# Patient Record
Sex: FEMALE | Race: WHITE
[De-identification: names, ages, dates, MRNs, and addresses within clinical notes are randomized per-mention and may not be internally consistent; named-entity substitution may affect disease eponyms.]

---

## 2020-09-19 ENCOUNTER — HOSPITAL ENCOUNTER (EMERGENCY)
Dept: HOSPITAL 8 - ED | Age: 30
Discharge: HOME | End: 2020-09-19
Payer: MEDICAID

## 2020-09-19 VITALS — DIASTOLIC BLOOD PRESSURE: 43 MMHG | SYSTOLIC BLOOD PRESSURE: 141 MMHG

## 2020-09-19 VITALS — HEIGHT: 66 IN | WEIGHT: 150.58 LBS | BODY MASS INDEX: 24.2 KG/M2

## 2020-09-19 DIAGNOSIS — I50.9: ICD-10-CM
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
## 2020-09-19 DIAGNOSIS — F32.9: ICD-10-CM

## 2020-09-19 DIAGNOSIS — F10.220: Primary | ICD-10-CM

## 2020-09-19 LAB
ALBUMIN SERPL-MCNC: 4.2 G/DL (ref 3.4–5)
ALP SERPL-CCNC: 57 U/L (ref 45–117)
ALT SERPL-CCNC: 27 U/L (ref 12–78)
ANION GAP SERPL CALC-SCNC: 8 MMOL/L (ref 5–15)
BASOPHILS # BLD AUTO: 0.03 X10^3/UL (ref 0–0.1)
BASOPHILS NFR BLD AUTO: 0 % (ref 0–1)
BILIRUB SERPL-MCNC: 0.5 MG/DL (ref 0.2–1)
CALCIUM SERPL-MCNC: 8.4 MG/DL (ref 8.5–10.1)
CHLORIDE SERPL-SCNC: 112 MMOL/L (ref 98–107)
CREAT SERPL-MCNC: 0.83 MG/DL (ref 0.55–1.02)
EOSINOPHIL # BLD AUTO: 0.1 X10^3/UL (ref 0–0.4)
EOSINOPHIL NFR BLD AUTO: 1 % (ref 1–7)
ERYTHROCYTE [DISTWIDTH] IN BLOOD BY AUTOMATED COUNT: 13.1 % (ref 9.6–15.2)
LYMPHOCYTES # BLD AUTO: 2.78 X10^3/UL (ref 1–3.4)
LYMPHOCYTES NFR BLD AUTO: 39 % (ref 22–44)
MCH RBC QN AUTO: 30.4 PG (ref 27–34.8)
MCHC RBC AUTO-ENTMCNC: 33.5 G/DL (ref 32.4–35.8)
MCV RBC AUTO: 90.9 FL (ref 80–100)
MD: NO
MONOCYTES # BLD AUTO: 0.5 X10^3/UL (ref 0.2–0.8)
MONOCYTES NFR BLD AUTO: 7 % (ref 2–9)
NEUTROPHILS # BLD AUTO: 3.82 X10^3/UL (ref 1.8–6.8)
NEUTROPHILS NFR BLD AUTO: 53 % (ref 42–75)
PLATELET # BLD AUTO: 298 X10^3/UL (ref 130–400)
PMV BLD AUTO: 9.1 FL (ref 7.4–10.4)
PROT SERPL-MCNC: 8.1 G/DL (ref 6.4–8.2)
RBC # BLD AUTO: 4.74 X10^6/UL (ref 3.82–5.3)

## 2020-09-19 PROCEDURE — 80053 COMPREHEN METABOLIC PANEL: CPT

## 2020-09-19 PROCEDURE — 96360 HYDRATION IV INFUSION INIT: CPT

## 2020-09-19 PROCEDURE — 99283 EMERGENCY DEPT VISIT LOW MDM: CPT

## 2020-09-19 PROCEDURE — 84703 CHORIONIC GONADOTROPIN ASSAY: CPT

## 2020-09-19 PROCEDURE — 36415 COLL VENOUS BLD VENIPUNCTURE: CPT

## 2020-09-19 PROCEDURE — 83690 ASSAY OF LIPASE: CPT

## 2020-09-19 PROCEDURE — 96361 HYDRATE IV INFUSION ADD-ON: CPT

## 2020-09-19 PROCEDURE — 85025 COMPLETE CBC W/AUTO DIFF WBC: CPT

## 2020-09-19 NOTE — NUR
FIRST CONTACT WITH PT. PT IS HERE FOR DETOX. HX CHF. LAST DRINK TODAY AROUND 
1PM. PT C/O N/V. PT'S AOX4. RESPS EVEN AND UNLABORED. BP/SPO2 MONITORS IN 
PLACE. CALL LIGHT WITHIN REACH. PA AT BEDSIDE EVALUATING AT THIS TIME.

## 2020-09-19 NOTE — NUR
piv est on l hand with no complications. pt medicated per emar. ns infusing at 
this time. pt tolerated well.

## 2021-01-06 ENCOUNTER — NURSE TRIAGE (OUTPATIENT)
Dept: HEALTH INFORMATION MANAGEMENT | Facility: OTHER | Age: 31
End: 2021-01-06

## 2021-01-06 NOTE — TELEPHONE ENCOUNTER
"PT and boyfriend calling in d/t symptom severity. Pt dx with Covid and symptomatic over a week. Feels like she's getting worse, not better. Has Hx of CHF and is having diff breathing upon exertion and is feeling chest heaviness. advised on home care options and to go to  to get evaluated d/t high risk diagnosis.    Reason for Disposition  • MILD difficulty breathing (e.g., minimal/no SOB at rest, SOB with walking, pulse <100)    Additional Information  • Negative: SEVERE difficulty breathing (e.g., struggling for each breath, speaks in single words)  • Negative: Difficult to awaken or acting confused (e.g., disoriented, slurred speech)  • Negative: Bluish (or gray) lips or face now  • Negative: Shock suspected (e.g., cold/pale/clammy skin, too weak to stand, low BP, rapid pulse)  • Negative: Sounds like a life-threatening emergency to the triager  • Negative: [1] COVID-19 suspected (e.g., cough, fever, shortness of breath) AND [2] public health department recommends testing  • Negative: [1] COVID-19 exposure AND [2] no symptoms  • Negative: COVID-19 and Breastfeeding, questions about  • Negative: SEVERE or constant chest pain (Exception: mild central chest pain, present only when coughing)  • Negative: MODERATE difficulty breathing (e.g., speaks in phrases, SOB even at rest, pulse 100-120)    Answer Assessment - Initial Assessment Questions  1. COVID-19 DIAGNOSIS: \"Who made your Coronavirus (COVID-19) diagnosis?\" \"Was it confirmed by a positive lab test?\" If not diagnosed by a HCP, ask \"Are there lots of cases (community spread) where you live?\" (See public health department website, if unsure)    * MAJOR community spread: high number of cases; numbers of cases are increasing; many people hospitalized.    * MINOR community spread: low number of cases; not increasing; few or no people hospitalized      Major, exposed Sunday  2. ONSET: \"When did the COVID-19 symptoms start?\"      1/29  3. WORST SYMPTOM: \"What is " "your worst symptom?\" (e.g., cough, fever, shortness of breath, muscle aches)    Chills    4. COUGH: \"How bad is the cough?\"        mild  5. FEVER: \"Do you have a fever?\" If so, ask: \"What is your temperature, how was it measured, and when did it start?\"      unsure  6. RESPIRATORY STATUS: \"Describe your breathing?\" (e.g., shortness of breath, wheezing, unable to speak)       Chest heaviness  7. BETTER-SAME-WORSE: \"Are you getting better, staying the same or getting worse compared to yesterday?\"  If getting worse, ask, \"In what way?\"      worse  8. HIGH RISK DISEASE: \"Do you have any chronic medical problems?\" (e.g., asthma, heart or lung disease, weak immune system, etc.)      CHF,   9. PREGNANCY: \"Is there any chance you are pregnant?\" \"When was your last menstrual period?\"      No  10. OTHER SYMPTOMS: \"Do you have any other symptoms?\"  (e.g., runny nose, headache, sore throat, loss of smell)        Fever, bodyaches, chills, fatigue, HA, nausea, vomitting diarrhea, SOB, cough, congestion, ST, loss taste/smell    Protocols used: CORONAVIRUS (COVID-19) DIAGNOSED OR [id number]      "

## 2021-01-07 ENCOUNTER — OFFICE VISIT (OUTPATIENT)
Dept: URGENT CARE | Facility: CLINIC | Age: 31
End: 2021-01-07
Payer: MEDICAID

## 2021-01-07 ENCOUNTER — APPOINTMENT (OUTPATIENT)
Dept: RADIOLOGY | Facility: IMAGING CENTER | Age: 31
End: 2021-01-07
Attending: PHYSICIAN ASSISTANT
Payer: MEDICAID

## 2021-01-07 VITALS
BODY MASS INDEX: 24.59 KG/M2 | RESPIRATION RATE: 14 BRPM | OXYGEN SATURATION: 99 % | WEIGHT: 153 LBS | TEMPERATURE: 97 F | HEART RATE: 84 BPM | DIASTOLIC BLOOD PRESSURE: 88 MMHG | HEIGHT: 66 IN | SYSTOLIC BLOOD PRESSURE: 108 MMHG

## 2021-01-07 DIAGNOSIS — R05.9 COUGH: ICD-10-CM

## 2021-01-07 DIAGNOSIS — R06.02 SOB (SHORTNESS OF BREATH): ICD-10-CM

## 2021-01-07 DIAGNOSIS — U07.1 COVID-19: ICD-10-CM

## 2021-01-07 DIAGNOSIS — U07.1 COVID-19: Primary | ICD-10-CM

## 2021-01-07 PROCEDURE — 99214 OFFICE O/P EST MOD 30 MIN: CPT | Mod: CS | Performed by: PHYSICIAN ASSISTANT

## 2021-01-07 PROCEDURE — 71046 X-RAY EXAM CHEST 2 VIEWS: CPT | Mod: TC,CS | Performed by: PHYSICIAN ASSISTANT

## 2021-01-07 RX ORDER — DEXAMETHASONE 4 MG/1
2 TABLET ORAL 2 TIMES DAILY
Qty: 1 EACH | Refills: 0 | Status: SHIPPED | OUTPATIENT
Start: 2021-01-07 | End: 2021-01-17

## 2021-01-07 ASSESSMENT — ENCOUNTER SYMPTOMS
DIARRHEA: 0
NAUSEA: 0
SPUTUM PRODUCTION: 1
FEVER: 1
SHORTNESS OF BREATH: 1
ABDOMINAL PAIN: 0
VOMITING: 0
COUGH: 1
MYALGIAS: 1
CONSTIPATION: 0
CHILLS: 1

## 2021-01-07 NOTE — PROGRESS NOTES
Subjective:   Juliette Snyder is a 30 y.o. female who presents for Cough (Cough, SOB, hot, cold Covid Positive 1/4/21, wants to check for pneumonia )        Cough  This is a new problem. Episode onset: 10 days  The problem has been unchanged. The cough is productive of sputum. Associated symptoms include chills, a fever, myalgias, nasal congestion and shortness of breath. Risk factors: Occassional tobacco use  Treatments tried: dayquil, nyquil, musinex, ibupofen, vicks  There is no history of asthma, bronchitis or pneumonia.     The patient has had Covid symptoms for the past 10 days. She tested positive for Covid on January 4. She is still experiencing cough, fever, myalgias. Her symptoms have slightly improved. She is concerned she may have Covid pneumonia.    Review of Systems   Constitutional: Positive for chills and fever. Negative for malaise/fatigue.   Respiratory: Positive for cough, sputum production and shortness of breath.    Gastrointestinal: Negative for abdominal pain, constipation, diarrhea, nausea and vomiting.   Musculoskeletal: Positive for myalgias.   All other systems reviewed and are negative.      PMH:  has a past medical history of Bronchitis, Hemorrhoids, and bladder infections.  MEDS:   Current Outpatient Medications:   •  fluticasone (FLOVENT HFA) 110 MCG/ACT Aerosol, Inhale 2 Puffs 2 times a day for 10 days., Disp: 1 Each, Rfl: 0  •  ibuprofen (MOTRIN) 800 MG Tab, Take 1 Tab by mouth every 8 hours as needed., Disp: 30 Tab, Rfl: 2  •  Omega-3 Fatty Acids (FISH OIL) 1000 MG Cap capsule, Take 1,000 mg by mouth 3 times a day, with meals., Disp: , Rfl:   •  5-Hydroxytryptophan (5-HTP PO), Take  by mouth., Disp: , Rfl:   •  norethindrone (MICRONOR) 0.35 MG tablet, Take 1 Tab by mouth every day., Disp: 28 Tab, Rfl: 11  •  Prenatal MV-Min-Fe Fum-FA-DHA (PRENATAL 1 PO), Take  by mouth., Disp: , Rfl:   ALLERGIES: No Known Allergies  SURGHX: History reviewed. No pertinent surgical history.  SOCHX:   "reports that she has been smoking cigarettes. She has a 4.50 pack-year smoking history. She has never used smokeless tobacco. She reports current alcohol use. She reports that she does not use drugs.  Family History   Problem Relation Age of Onset   • Other Mother         killed   • Diabetes Maternal Grandfather    • Hypertension Maternal Grandfather         Objective:   /88 (BP Location: Left arm, Patient Position: Sitting, BP Cuff Size: Adult)   Pulse 84   Temp 36.1 °C (97 °F) (Temporal)   Resp 14   Ht 1.676 m (5' 6\")   Wt 69.4 kg (153 lb)   SpO2 99%   BMI 24.69 kg/m²     Physical Exam  Vitals signs reviewed.   Constitutional:       General: She is not in acute distress.     Appearance: Normal appearance. She is well-developed. She is not ill-appearing.   HENT:      Head: Normocephalic and atraumatic.      Right Ear: External ear normal.      Left Ear: External ear normal.      Nose: Nose normal.      Mouth/Throat:      Mouth: Mucous membranes are moist.   Eyes:      Conjunctiva/sclera: Conjunctivae normal.      Pupils: Pupils are equal, round, and reactive to light.   Neck:      Musculoskeletal: Normal range of motion and neck supple. No neck rigidity.      Trachea: No tracheal deviation.   Cardiovascular:      Rate and Rhythm: Normal rate and regular rhythm.   Pulmonary:      Effort: Pulmonary effort is normal. No respiratory distress.      Breath sounds: Normal breath sounds. No stridor. No wheezing.   Skin:     General: Skin is warm and dry.      Capillary Refill: Capillary refill takes less than 2 seconds.   Neurological:      General: No focal deficit present.      Mental Status: She is alert and oriented to person, place, and time.   Psychiatric:         Mood and Affect: Mood normal.         Behavior: Behavior normal.           Assessment/Plan:     1. COVID-19  DX-CHEST-2 VIEWS    fluticasone (FLOVENT HFA) 110 MCG/ACT Aerosol   2. SOB (shortness of breath)  DX-CHEST-2 VIEWS   3. Cough  " DX-CHEST-2 VIEWS     Chest x-ray: Negative    Supportive care reviewed. Continue to monitor symptoms closely. She will continue to isolate until her symptoms have resolved for 24 hours.    If symptoms worsen or persist patient can return to clinic for reevaluation.  Red flags and emergency room precautions discussed. All side effects of medication discussed including allergic response, GI upset, tendon injury, etc. Patient confirmed understanding of information.    Please note that this dictation was created using voice recognition software. I have made every reasonable attempt to correct obvious errors, but I expect that there are errors of grammar and possibly content that I did not discover before finalizing the note.

## 2021-02-09 ENCOUNTER — HOSPITAL ENCOUNTER (EMERGENCY)
Dept: HOSPITAL 8 - ED | Age: 31
Discharge: HOME | End: 2021-02-09
Payer: MEDICAID

## 2021-02-09 VITALS — WEIGHT: 149.91 LBS | HEIGHT: 66 IN | BODY MASS INDEX: 24.09 KG/M2

## 2021-02-09 VITALS — SYSTOLIC BLOOD PRESSURE: 119 MMHG | DIASTOLIC BLOOD PRESSURE: 67 MMHG

## 2021-02-09 DIAGNOSIS — J02.8: Primary | ICD-10-CM

## 2021-02-09 DIAGNOSIS — I50.9: ICD-10-CM

## 2021-02-09 DIAGNOSIS — I44.7: ICD-10-CM

## 2021-02-09 PROCEDURE — 99284 EMERGENCY DEPT VISIT MOD MDM: CPT

## 2021-02-09 PROCEDURE — 87880 STREP A ASSAY W/OPTIC: CPT

## 2021-02-09 PROCEDURE — 93005 ELECTROCARDIOGRAM TRACING: CPT

## 2021-02-09 PROCEDURE — 87081 CULTURE SCREEN ONLY: CPT

## 2021-02-09 NOTE — NUR
PT MEDICATED PER MAR. PAIN 10/10. PT STATES SHE FEELS VERY ANXIOUS AND IS 
PACING THE ROOM. PT SETTLED BACK IN BED WITH CALL LIGHT. PT SIGNIFICANT OTHER 
BEDSIDE.

## 2021-02-09 NOTE — NUR
Patient presents to ER c/o swollen right side tonsil causing some diff 
swallowing. Patient had COVID 1/5. Afterward developed a kidney infection which 
she just finished antiobiotics for. Patient has now developed current symptoms. 


Patient is anxious. Respirations even and unlabored. Right side of neck 
swollen. Patient is speaking in full sentences.

## 2021-02-09 NOTE — NUR
PT REC'VD DISCHARGE INSTRUCTIONS AND EDUCATION. PT HAD NO FURTHER QUESTIONS. PT 
AMBULATED TO DC AREA WITH SIGNIFICANT OTHER, STEADY GAIT.

## 2021-04-08 ENCOUNTER — HOSPITAL ENCOUNTER (EMERGENCY)
Dept: HOSPITAL 8 - ED | Age: 31
LOS: 1 days | Discharge: HOME | End: 2021-04-09
Payer: MEDICAID

## 2021-04-08 VITALS — BODY MASS INDEX: 25.16 KG/M2 | WEIGHT: 156.53 LBS | HEIGHT: 66 IN

## 2021-04-08 DIAGNOSIS — E86.0: ICD-10-CM

## 2021-04-08 DIAGNOSIS — F10.129: ICD-10-CM

## 2021-04-08 DIAGNOSIS — R07.89: ICD-10-CM

## 2021-04-08 DIAGNOSIS — Z72.9: ICD-10-CM

## 2021-04-08 DIAGNOSIS — K29.20: Primary | ICD-10-CM

## 2021-04-08 DIAGNOSIS — R10.13: ICD-10-CM

## 2021-04-08 DIAGNOSIS — R11.2: ICD-10-CM

## 2021-04-08 DIAGNOSIS — I50.9: ICD-10-CM
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
## 2021-04-08 PROCEDURE — 85025 COMPLETE CBC W/AUTO DIFF WBC: CPT

## 2021-04-08 PROCEDURE — 80320 DRUG SCREEN QUANTALCOHOLS: CPT

## 2021-04-08 PROCEDURE — 83690 ASSAY OF LIPASE: CPT

## 2021-04-08 PROCEDURE — 36415 COLL VENOUS BLD VENIPUNCTURE: CPT

## 2021-04-08 PROCEDURE — 84484 ASSAY OF TROPONIN QUANT: CPT

## 2021-04-08 PROCEDURE — 96375 TX/PRO/DX INJ NEW DRUG ADDON: CPT

## 2021-04-08 PROCEDURE — 93005 ELECTROCARDIOGRAM TRACING: CPT

## 2021-04-08 PROCEDURE — 96361 HYDRATE IV INFUSION ADD-ON: CPT

## 2021-04-08 PROCEDURE — 80053 COMPREHEN METABOLIC PANEL: CPT

## 2021-04-08 PROCEDURE — 84703 CHORIONIC GONADOTROPIN ASSAY: CPT

## 2021-04-08 PROCEDURE — 99284 EMERGENCY DEPT VISIT MOD MDM: CPT

## 2021-04-08 PROCEDURE — 71045 X-RAY EXAM CHEST 1 VIEW: CPT

## 2021-04-08 PROCEDURE — G0480 DRUG TEST DEF 1-7 CLASSES: HCPCS

## 2021-04-08 PROCEDURE — 96374 THER/PROPH/DIAG INJ IV PUSH: CPT

## 2021-04-09 VITALS — SYSTOLIC BLOOD PRESSURE: 133 MMHG | DIASTOLIC BLOOD PRESSURE: 83 MMHG

## 2021-04-09 LAB
ALBUMIN SERPL-MCNC: 4.5 G/DL (ref 3.4–5)
ALP SERPL-CCNC: 61 U/L (ref 45–117)
ALT SERPL-CCNC: 39 U/L (ref 12–78)
ANION GAP SERPL CALC-SCNC: 13 MMOL/L (ref 5–15)
BASOPHILS # BLD AUTO: 0.1 X10^3/UL (ref 0–0.1)
BASOPHILS NFR BLD AUTO: 1 % (ref 0–1)
BILIRUB SERPL-MCNC: 0.5 MG/DL (ref 0.2–1)
CALCIUM SERPL-MCNC: 9.4 MG/DL (ref 8.5–10.1)
CHLORIDE SERPL-SCNC: 110 MMOL/L (ref 98–107)
CREAT SERPL-MCNC: 1.08 MG/DL (ref 0.55–1.02)
EOSINOPHIL # BLD AUTO: 0 X10^3/UL (ref 0–0.4)
EOSINOPHIL NFR BLD AUTO: 0 % (ref 1–7)
ERYTHROCYTE [DISTWIDTH] IN BLOOD BY AUTOMATED COUNT: 14 % (ref 9.6–15.2)
LYMPHOCYTES # BLD AUTO: 1.8 X10^3/UL (ref 1–3.4)
LYMPHOCYTES NFR BLD AUTO: 16 % (ref 22–44)
MCH RBC QN AUTO: 32.3 PG (ref 27–34.8)
MCHC RBC AUTO-ENTMCNC: 34 G/DL (ref 32.4–35.8)
MD: NO
MONOCYTES # BLD AUTO: 0.5 X10^3/UL (ref 0.2–0.8)
MONOCYTES NFR BLD AUTO: 4 % (ref 2–9)
NEUTROPHILS # BLD AUTO: 8.9 X10^3/UL (ref 1.8–6.8)
NEUTROPHILS NFR BLD AUTO: 79 % (ref 42–75)
PLATELET # BLD AUTO: 317 X10^3/UL (ref 130–400)
PMV BLD AUTO: 10.2 FL (ref 7.4–10.4)
PROT SERPL-MCNC: 8.4 G/DL (ref 6.4–8.2)
RBC # BLD AUTO: 4.64 X10^6/UL (ref 3.82–5.3)
TROPONIN I SERPL-MCNC: < 0.015 NG/ML (ref 0–0.04)

## 2021-04-09 NOTE — NUR
Patient given discharge instructions and they have confirmed that they 
understand the instructions.  Patient ambulatory with steady gait. nad, denies 
additional questions or needs, no personal belongings left in room after dc.

## 2021-04-09 NOTE — NUR
BREAK RN: PT CURRENTLY SITTING ON [employer]. NO ACUTE DISTRESS NOTED AT THIS TIME. 
PT APPEARS TO BE RESTING COMFORTABLY. SKIN PINK, WARM AND DRY. RESP EVEN AND 
UNLABORED. SIGNIFICANT OTHER AT BEDSIDE. PT ON CONT BP, CARDIAC AND SPO2 
MONITORS. ICE CHIPS PROVIDED BY PRIMARY RN. CALL LIGHT WITHIN REACH.

## 2021-04-09 NOTE — NUR
PT C/O COMING INTO ED TODAY D/T ANXIETY, N/V Q15 MINS AND CP. PT BROUGHT BACK 
TO ROOM, THROWING UP BILE AND FOAM, PT IV ESTABLISHED, LABS OBTAINED, WALKED TO 
AND FROM RESTROOM WITH A SMOOTH AND STEADY GAIT, UA OBTAINED AND SENT TO LAB. 
PT MEDICATED PER MAR, SO AT BS, BED IN LOWEST, RAILS ENGAGED, CALL LIGHT ON 
LAP, WCTM.

## 2021-04-09 NOTE — NUR
pt resting on gurney, appears slightly uncomfortable again, states the nausea 
is back, erp aware, pt medicated per mar, nad, wctm. to be dc'd

## 2021-05-03 ENCOUNTER — NURSE TRIAGE (OUTPATIENT)
Dept: HEALTH INFORMATION MANAGEMENT | Facility: OTHER | Age: 31
End: 2021-05-03

## 2021-05-03 ENCOUNTER — HOSPITAL ENCOUNTER (EMERGENCY)
Dept: HOSPITAL 8 - ED | Age: 31
Discharge: HOME | End: 2021-05-03
Payer: MEDICAID

## 2021-05-03 VITALS — BODY MASS INDEX: 23.77 KG/M2 | HEIGHT: 66 IN | WEIGHT: 147.93 LBS

## 2021-05-03 VITALS — SYSTOLIC BLOOD PRESSURE: 128 MMHG | DIASTOLIC BLOOD PRESSURE: 78 MMHG

## 2021-05-03 DIAGNOSIS — R10.11: ICD-10-CM

## 2021-05-03 DIAGNOSIS — R10.31: ICD-10-CM

## 2021-05-03 DIAGNOSIS — K52.9: Primary | ICD-10-CM

## 2021-05-03 LAB
ALBUMIN SERPL-MCNC: 4.3 G/DL (ref 3.4–5)
ALP SERPL-CCNC: 62 U/L (ref 45–117)
ALT SERPL-CCNC: 46 U/L (ref 12–78)
ANION GAP SERPL CALC-SCNC: 9 MMOL/L (ref 5–15)
BASOPHILS # BLD AUTO: 0.1 X10^3/UL (ref 0–0.1)
BASOPHILS NFR BLD AUTO: 1 % (ref 0–1)
BILIRUB SERPL-MCNC: 0.5 MG/DL (ref 0.2–1)
CALCIUM SERPL-MCNC: 8.6 MG/DL (ref 8.5–10.1)
CHLORIDE SERPL-SCNC: 111 MMOL/L (ref 98–107)
CREAT SERPL-MCNC: 0.78 MG/DL (ref 0.55–1.02)
EOSINOPHIL # BLD AUTO: 0.1 X10^3/UL (ref 0–0.4)
EOSINOPHIL NFR BLD AUTO: 1 % (ref 1–7)
ERYTHROCYTE [DISTWIDTH] IN BLOOD BY AUTOMATED COUNT: 13.5 % (ref 9.6–15.2)
LYMPHOCYTES # BLD AUTO: 1.9 X10^3/UL (ref 1–3.4)
LYMPHOCYTES NFR BLD AUTO: 23 % (ref 22–44)
MCH RBC QN AUTO: 32.3 PG (ref 27–34.8)
MCHC RBC AUTO-ENTMCNC: 33.9 G/DL (ref 32.4–35.8)
MICROSCOPIC: (no result)
MONOCYTES # BLD AUTO: 0.5 X10^3/UL (ref 0.2–0.8)
MONOCYTES NFR BLD AUTO: 6 % (ref 2–9)
NEUTROPHILS # BLD AUTO: 5.7 X10^3/UL (ref 1.8–6.8)
NEUTROPHILS NFR BLD AUTO: 69 % (ref 42–75)
PLATELET # BLD AUTO: 330 X10^3/UL (ref 130–400)
PMV BLD AUTO: 8.7 FL (ref 7.4–10.4)
PROT SERPL-MCNC: 8.3 G/DL (ref 6.4–8.2)
RBC # BLD AUTO: 4.34 X10^6/UL (ref 3.82–5.3)

## 2021-05-03 PROCEDURE — 36415 COLL VENOUS BLD VENIPUNCTURE: CPT

## 2021-05-03 PROCEDURE — 84703 CHORIONIC GONADOTROPIN ASSAY: CPT

## 2021-05-03 PROCEDURE — 80053 COMPREHEN METABOLIC PANEL: CPT

## 2021-05-03 PROCEDURE — 83690 ASSAY OF LIPASE: CPT

## 2021-05-03 PROCEDURE — 76700 US EXAM ABDOM COMPLETE: CPT

## 2021-05-03 PROCEDURE — 87086 URINE CULTURE/COLONY COUNT: CPT

## 2021-05-03 PROCEDURE — 99285 EMERGENCY DEPT VISIT HI MDM: CPT

## 2021-05-03 PROCEDURE — 96361 HYDRATE IV INFUSION ADD-ON: CPT

## 2021-05-03 PROCEDURE — 85025 COMPLETE CBC W/AUTO DIFF WBC: CPT

## 2021-05-03 PROCEDURE — 96375 TX/PRO/DX INJ NEW DRUG ADDON: CPT

## 2021-05-03 PROCEDURE — 96374 THER/PROPH/DIAG INJ IV PUSH: CPT

## 2021-05-03 PROCEDURE — 96376 TX/PRO/DX INJ SAME DRUG ADON: CPT

## 2021-05-03 PROCEDURE — 74021 RADEX ABDOMEN 3+ VIEWS: CPT

## 2021-05-03 PROCEDURE — 81001 URINALYSIS AUTO W/SCOPE: CPT

## 2021-05-03 RX ADMIN — MORPHINE SULFATE PRN MG: 4 INJECTION INTRAVENOUS at 13:31

## 2021-05-03 RX ADMIN — MORPHINE SULFATE PRN MG: 4 INJECTION INTRAVENOUS at 14:31

## 2021-05-03 NOTE — NUR
RLQ stabbing pain. Refused palpation in triage, states hurts worse with 
pressing on it. Drank ETOH for pain.  at bedside. pt attached to 
monitors. vss. steady gait back from bathroom. Darshan Art evaluated.

## 2021-05-03 NOTE — TELEPHONE ENCOUNTER
"Patient has 8 out of 10 stabbing abdominal pain for over 6 hours in RLQ. Advised to go to ED.     Reason for Disposition  • SEVERE abdominal pain (e.g., excruciating)    Additional Information  • Negative: Passed out (i.e., fainted, collapsed and was not responding)  • Negative: Shock suspected (e.g., cold/pale/clammy skin, too weak to stand, low BP, rapid pulse)  • Negative: Sounds like a life-threatening emergency to the triager  • Negative: Chest pain  • Negative: Pain is mainly in upper abdomen (if needed ask: 'is it mainly above the belly button?')  • Negative: Abdominal pain and pregnant > 20 weeks  • Negative: Abdominal pain and pregnant < 20 weeks    Answer Assessment - Initial Assessment Questions  1. LOCATION: \"Where does it hurt?\"       RLQ  2. RADIATION: \"Does the pain shoot anywhere else?\" (e.g., chest, back)      unknown  3. ONSET: \"When did the pain begin?\" (e.g., minutes, hours or days ago)       today  4. SUDDEN: \"Gradual or sudden onset?\"      suddenly  5. PATTERN \"Does the pain come and go, or is it constant?\"     - If constant: \"Is it getting better, staying the same, or worsening?\"       (Note: Constant means the pain never goes away completely; most serious pain is constant and it progresses)      - If intermittent: \"How long does it last?\" \"Do you have pain now?\"      (Note: Intermittent means the pain goes away completely between bouts)      constant  6. SEVERITY: \"How bad is the pain?\"  (e.g., Scale 1-10; mild, moderate, or severe)    - MILD (1-3): doesn't interfere with normal activities, abdomen soft and not tender to touch     - MODERATE (4-7): interferes with normal activities or awakens from sleep, tender to touch     - SEVERE (8-10): excruciating pain, doubled over, unable to do any normal activities       severe  7. RECURRENT SYMPTOM: \"Have you ever had this type of abdominal pain before?\" If so, ask: \"When was the last time?\" and \"What happened that time?\"       never  8. CAUSE: " "\"What do you think is causing the abdominal pain?\"      unknown  9. RELIEVING/AGGRAVATING FACTORS: \"What makes it better or worse?\" (e.g., movement, antacids, bowel movement)      unknown  10. OTHER SYMPTOMS: \"Has there been any vomiting, diarrhea, constipation, or urine problems?\"        none  11. PREGNANCY: \"Is there any chance you are pregnant?\" \"When was your last menstrual period?\"        Na    Protocols used: ABDOMINAL PAIN - FEMALE-A-OH      "

## 2021-07-19 ENCOUNTER — HOSPITAL ENCOUNTER (EMERGENCY)
Dept: HOSPITAL 8 - ED | Age: 31
Discharge: HOME | End: 2021-07-19
Payer: MEDICAID

## 2021-07-19 VITALS — SYSTOLIC BLOOD PRESSURE: 120 MMHG | DIASTOLIC BLOOD PRESSURE: 72 MMHG

## 2021-07-19 VITALS — BODY MASS INDEX: 22.04 KG/M2 | HEIGHT: 66 IN | WEIGHT: 137.13 LBS

## 2021-07-19 DIAGNOSIS — R11.2: ICD-10-CM

## 2021-07-19 DIAGNOSIS — E87.6: ICD-10-CM

## 2021-07-19 DIAGNOSIS — N10: Primary | ICD-10-CM

## 2021-07-19 DIAGNOSIS — I11.0: ICD-10-CM

## 2021-07-19 DIAGNOSIS — N93.9: ICD-10-CM

## 2021-07-19 DIAGNOSIS — I50.9: ICD-10-CM

## 2021-07-19 DIAGNOSIS — N30.00: ICD-10-CM

## 2021-07-19 LAB
ALBUMIN SERPL-MCNC: 4.1 G/DL (ref 3.4–5)
ALP SERPL-CCNC: 67 U/L (ref 45–117)
ALT SERPL-CCNC: 125 U/L (ref 12–78)
ANION GAP SERPL CALC-SCNC: 8 MMOL/L (ref 5–15)
BASOPHILS # BLD AUTO: 0 X10^3/UL (ref 0–0.1)
BASOPHILS NFR BLD AUTO: 0 % (ref 0–1)
BILIRUB SERPL-MCNC: 0.6 MG/DL (ref 0.2–1)
CALCIUM SERPL-MCNC: 8.6 MG/DL (ref 8.5–10.1)
CHLORIDE SERPL-SCNC: 106 MMOL/L (ref 98–107)
CREAT SERPL-MCNC: 0.8 MG/DL (ref 0.55–1.02)
EOSINOPHIL # BLD AUTO: 0 X10^3/UL (ref 0–0.4)
EOSINOPHIL NFR BLD AUTO: 0 % (ref 1–7)
ERYTHROCYTE [DISTWIDTH] IN BLOOD BY AUTOMATED COUNT: 14 % (ref 9.6–15.2)
LYMPHOCYTES # BLD AUTO: 2.3 X10^3/UL (ref 1–3.4)
LYMPHOCYTES NFR BLD AUTO: 32 % (ref 22–44)
MCH RBC QN AUTO: 32.2 PG (ref 27–34.8)
MCHC RBC AUTO-ENTMCNC: 34.3 G/DL (ref 32.4–35.8)
MICROSCOPIC: (no result)
MONOCYTES # BLD AUTO: 0.5 X10^3/UL (ref 0.2–0.8)
MONOCYTES NFR BLD AUTO: 7 % (ref 2–9)
NEUTROPHILS # BLD AUTO: 4.2 X10^3/UL (ref 1.8–6.8)
NEUTROPHILS NFR BLD AUTO: 60 % (ref 42–75)
PLATELET # BLD AUTO: 145 X10^3/UL (ref 130–400)
PMV BLD AUTO: 10.2 FL (ref 7.4–10.4)
PROT SERPL-MCNC: 8 G/DL (ref 6.4–8.2)
RBC # BLD AUTO: 4.5 X10^6/UL (ref 3.82–5.3)

## 2021-07-19 PROCEDURE — 85025 COMPLETE CBC W/AUTO DIFF WBC: CPT

## 2021-07-19 PROCEDURE — 99284 EMERGENCY DEPT VISIT MOD MDM: CPT

## 2021-07-19 PROCEDURE — 96361 HYDRATE IV INFUSION ADD-ON: CPT

## 2021-07-19 PROCEDURE — 80053 COMPREHEN METABOLIC PANEL: CPT

## 2021-07-19 PROCEDURE — 84703 CHORIONIC GONADOTROPIN ASSAY: CPT

## 2021-07-19 PROCEDURE — 96375 TX/PRO/DX INJ NEW DRUG ADDON: CPT

## 2021-07-19 PROCEDURE — 96365 THER/PROPH/DIAG IV INF INIT: CPT

## 2021-07-19 PROCEDURE — 87186 SC STD MICRODIL/AGAR DIL: CPT

## 2021-07-19 PROCEDURE — 36415 COLL VENOUS BLD VENIPUNCTURE: CPT

## 2021-07-19 PROCEDURE — 87077 CULTURE AEROBIC IDENTIFY: CPT

## 2021-07-19 PROCEDURE — 81001 URINALYSIS AUTO W/SCOPE: CPT

## 2021-07-19 PROCEDURE — 87086 URINE CULTURE/COLONY COUNT: CPT

## 2021-07-19 NOTE — NUR
PT BIB BOYFRIEND VIA POV. PT STATES "I HAVE A UTI AND MY DOCTOR TOLD ME SHE 
THINKS I AM TURNING SEPTIC." PT C/O BILATERAL FLANK PAIN, PAINFUL URINATION, 
BLOOD IN URINE, VOMITING, FEVERS. PT RESTING IN GURNEY, MONITORING IN PLACE, PT 
CRYING AND YELLING AT THIS TIME, BOYFRIEND AT BEDSIDE. PT BEING EXTREMELY 
UNCOOPERATIVE. WCTM.

## 2021-07-19 NOTE — NUR
PT MEDICATED PER EMAR, PT ONLY ALLOWING ED TECH TO PUT PIV IN FOOT, STATES "YOU 
DON'T KNOW ABOUT MY VEINS, YOU ONLY CAN GO IN MY FOOT". 22G PIV PLACED IN R 
FOOT BY EMT.

## 2022-01-27 ENCOUNTER — HOSPITAL ENCOUNTER (EMERGENCY)
Facility: MEDICAL CENTER | Age: 32
End: 2022-01-27
Attending: EMERGENCY MEDICINE
Payer: COMMERCIAL

## 2022-01-27 VITALS
SYSTOLIC BLOOD PRESSURE: 147 MMHG | WEIGHT: 149.47 LBS | HEIGHT: 66 IN | HEART RATE: 102 BPM | DIASTOLIC BLOOD PRESSURE: 80 MMHG | TEMPERATURE: 98.4 F | BODY MASS INDEX: 24.02 KG/M2 | RESPIRATION RATE: 18 BRPM | OXYGEN SATURATION: 96 %

## 2022-01-27 DIAGNOSIS — E86.0 DEHYDRATION: ICD-10-CM

## 2022-01-27 DIAGNOSIS — D72.829 LEUKOCYTOSIS, UNSPECIFIED TYPE: ICD-10-CM

## 2022-01-27 DIAGNOSIS — R11.2 NAUSEA AND VOMITING, INTRACTABILITY OF VOMITING NOT SPECIFIED, UNSPECIFIED VOMITING TYPE: ICD-10-CM

## 2022-01-27 DIAGNOSIS — F10.10 ALCOHOL ABUSE: ICD-10-CM

## 2022-01-27 DIAGNOSIS — F13.939 BENZODIAZEPINE WITHDRAWAL WITH COMPLICATION (HCC): ICD-10-CM

## 2022-01-27 LAB
ALBUMIN SERPL BCP-MCNC: 4.9 G/DL (ref 3.2–4.9)
ALBUMIN/GLOB SERPL: 1.8 G/DL
ALP SERPL-CCNC: 70 U/L (ref 30–99)
ALT SERPL-CCNC: 24 U/L (ref 2–50)
ANION GAP SERPL CALC-SCNC: 18 MMOL/L (ref 7–16)
APPEARANCE UR: ABNORMAL
AST SERPL-CCNC: 35 U/L (ref 12–45)
BACTERIA #/AREA URNS HPF: ABNORMAL /HPF
BASOPHILS # BLD AUTO: 0.4 % (ref 0–1.8)
BASOPHILS # BLD: 0.09 K/UL (ref 0–0.12)
BILIRUB SERPL-MCNC: 0.8 MG/DL (ref 0.1–1.5)
BILIRUB UR QL STRIP.AUTO: NEGATIVE
BUN SERPL-MCNC: 14 MG/DL (ref 8–22)
CALCIUM SERPL-MCNC: 8.7 MG/DL (ref 8.5–10.5)
CHLORIDE SERPL-SCNC: 107 MMOL/L (ref 96–112)
CO2 SERPL-SCNC: 18 MMOL/L (ref 20–33)
COLOR UR: ABNORMAL
CREAT SERPL-MCNC: 0.86 MG/DL (ref 0.5–1.4)
EOSINOPHIL # BLD AUTO: 0.01 K/UL (ref 0–0.51)
EOSINOPHIL NFR BLD: 0 % (ref 0–6.9)
EPI CELLS #/AREA URNS HPF: ABNORMAL /HPF
ERYTHROCYTE [DISTWIDTH] IN BLOOD BY AUTOMATED COUNT: 39.5 FL (ref 35.9–50)
GLOBULIN SER CALC-MCNC: 2.8 G/DL (ref 1.9–3.5)
GLUCOSE SERPL-MCNC: 171 MG/DL (ref 65–99)
GLUCOSE UR STRIP.AUTO-MCNC: NEGATIVE MG/DL
HCG SERPL QL: NEGATIVE
HCT VFR BLD AUTO: 40 % (ref 37–47)
HGB BLD-MCNC: 14.1 G/DL (ref 12–16)
IMM GRANULOCYTES # BLD AUTO: 0.11 K/UL (ref 0–0.11)
IMM GRANULOCYTES NFR BLD AUTO: 0.5 % (ref 0–0.9)
KETONES UR STRIP.AUTO-MCNC: ABNORMAL MG/DL
LEUKOCYTE ESTERASE UR QL STRIP.AUTO: ABNORMAL
LIPASE SERPL-CCNC: 22 U/L (ref 11–82)
LYMPHOCYTES # BLD AUTO: 2.27 K/UL (ref 1–4.8)
LYMPHOCYTES NFR BLD: 11.3 % (ref 22–41)
MCH RBC QN AUTO: 29.7 PG (ref 27–33)
MCHC RBC AUTO-ENTMCNC: 35.3 G/DL (ref 33.6–35)
MCV RBC AUTO: 84.2 FL (ref 81.4–97.8)
MICRO URNS: ABNORMAL
MONOCYTES # BLD AUTO: 0.94 K/UL (ref 0–0.85)
MONOCYTES NFR BLD AUTO: 4.7 % (ref 0–13.4)
NEUTROPHILS # BLD AUTO: 16.65 K/UL (ref 2–7.15)
NEUTROPHILS NFR BLD: 83.1 % (ref 44–72)
NITRITE UR QL STRIP.AUTO: NEGATIVE
NRBC # BLD AUTO: 0 K/UL
NRBC BLD-RTO: 0 /100 WBC
PH UR STRIP.AUTO: 5.5 [PH] (ref 5–8)
PLATELET # BLD AUTO: 282 K/UL (ref 164–446)
PMV BLD AUTO: 10.8 FL (ref 9–12.9)
POTASSIUM SERPL-SCNC: 3.3 MMOL/L (ref 3.6–5.5)
PROT SERPL-MCNC: 7.7 G/DL (ref 6–8.2)
PROT UR QL STRIP: 100 MG/DL
RBC # BLD AUTO: 4.75 M/UL (ref 4.2–5.4)
RBC # URNS HPF: ABNORMAL /HPF
RBC UR QL AUTO: ABNORMAL
SODIUM SERPL-SCNC: 143 MMOL/L (ref 135–145)
SP GR UR STRIP.AUTO: 1.04
UROBILINOGEN UR STRIP.AUTO-MCNC: 0.2 MG/DL
WBC # BLD AUTO: 20.1 K/UL (ref 4.8–10.8)
WBC #/AREA URNS HPF: ABNORMAL /HPF

## 2022-01-27 PROCEDURE — 83690 ASSAY OF LIPASE: CPT

## 2022-01-27 PROCEDURE — 85025 COMPLETE CBC W/AUTO DIFF WBC: CPT

## 2022-01-27 PROCEDURE — 99285 EMERGENCY DEPT VISIT HI MDM: CPT

## 2022-01-27 PROCEDURE — 700105 HCHG RX REV CODE 258: Performed by: EMERGENCY MEDICINE

## 2022-01-27 PROCEDURE — 87086 URINE CULTURE/COLONY COUNT: CPT

## 2022-01-27 PROCEDURE — 96361 HYDRATE IV INFUSION ADD-ON: CPT

## 2022-01-27 PROCEDURE — 700102 HCHG RX REV CODE 250 W/ 637 OVERRIDE(OP): Performed by: EMERGENCY MEDICINE

## 2022-01-27 PROCEDURE — 84703 CHORIONIC GONADOTROPIN ASSAY: CPT

## 2022-01-27 PROCEDURE — 96375 TX/PRO/DX INJ NEW DRUG ADDON: CPT

## 2022-01-27 PROCEDURE — 700111 HCHG RX REV CODE 636 W/ 250 OVERRIDE (IP): Performed by: EMERGENCY MEDICINE

## 2022-01-27 PROCEDURE — 96376 TX/PRO/DX INJ SAME DRUG ADON: CPT

## 2022-01-27 PROCEDURE — 80053 COMPREHEN METABOLIC PANEL: CPT

## 2022-01-27 PROCEDURE — 700111 HCHG RX REV CODE 636 W/ 250 OVERRIDE (IP)

## 2022-01-27 PROCEDURE — 81001 URINALYSIS AUTO W/SCOPE: CPT

## 2022-01-27 PROCEDURE — 96374 THER/PROPH/DIAG INJ IV PUSH: CPT

## 2022-01-27 PROCEDURE — A9270 NON-COVERED ITEM OR SERVICE: HCPCS | Performed by: EMERGENCY MEDICINE

## 2022-01-27 RX ORDER — LORAZEPAM 2 MG/ML
2 INJECTION INTRAMUSCULAR ONCE
Status: COMPLETED | OUTPATIENT
Start: 2022-01-27 | End: 2022-01-27

## 2022-01-27 RX ORDER — ONDANSETRON 2 MG/ML
4 INJECTION INTRAMUSCULAR; INTRAVENOUS ONCE
Status: COMPLETED | OUTPATIENT
Start: 2022-01-27 | End: 2022-01-27

## 2022-01-27 RX ORDER — PROMETHAZINE HYDROCHLORIDE 25 MG/1
25 SUPPOSITORY RECTAL EVERY 6 HOURS PRN
Qty: 10 SUPPOSITORY | Refills: 0 | Status: SHIPPED | OUTPATIENT
Start: 2022-01-27 | End: 2022-12-29

## 2022-01-27 RX ORDER — LORAZEPAM 2 MG/ML
1 INJECTION INTRAMUSCULAR ONCE
Status: COMPLETED | OUTPATIENT
Start: 2022-01-27 | End: 2022-01-27

## 2022-01-27 RX ORDER — ONDANSETRON 4 MG/1
4 TABLET, ORALLY DISINTEGRATING ORAL ONCE
Status: COMPLETED | OUTPATIENT
Start: 2022-01-27 | End: 2022-01-27

## 2022-01-27 RX ORDER — ONDANSETRON 4 MG/1
4 TABLET, ORALLY DISINTEGRATING ORAL EVERY 8 HOURS PRN
Qty: 10 TABLET | Refills: 0 | Status: SHIPPED | OUTPATIENT
Start: 2022-01-27 | End: 2022-12-29

## 2022-01-27 RX ORDER — SODIUM CHLORIDE 9 MG/ML
1000 INJECTION, SOLUTION INTRAVENOUS ONCE
Status: COMPLETED | OUTPATIENT
Start: 2022-01-27 | End: 2022-01-27

## 2022-01-27 RX ADMIN — SODIUM CHLORIDE 1000 ML: 9 INJECTION, SOLUTION INTRAVENOUS at 09:41

## 2022-01-27 RX ADMIN — ONDANSETRON 4 MG: 2 INJECTION INTRAMUSCULAR; INTRAVENOUS at 09:39

## 2022-01-27 RX ADMIN — ONDANSETRON 4 MG: 4 TABLET, ORALLY DISINTEGRATING ORAL at 07:58

## 2022-01-27 RX ADMIN — LORAZEPAM 2 MG: 2 INJECTION INTRAMUSCULAR; INTRAVENOUS at 09:57

## 2022-01-27 RX ADMIN — LIDOCAINE HYDROCHLORIDE 30 ML: 20 SOLUTION OROPHARYNGEAL at 09:42

## 2022-01-27 RX ADMIN — LORAZEPAM 1 MG: 2 INJECTION INTRAMUSCULAR; INTRAVENOUS at 09:40

## 2022-01-27 NOTE — ED TRIAGE NOTES
"Chief Complaint   Patient presents with   • Nausea/Vomiting/Diarrhea     since yesterday morning   • Alcohol Intoxication     last drink yesterday 1600       32 yo female to triage for above complaint. Patient reports she had been sober for 5 months, first drink was yesterday. Patient actively vomiting in triage.  Per EMS - patient given 4mg zofran ODT en route.    Pt is alert and oriented, speaking in full sentences, follows commands and responds appropriately to questions.     Patient placed back in lobby and educated on triage process. Asked to inform RN of any changes.    /97   Pulse 100   Temp 35.8 °C (96.5 °F) (Temporal)   Resp 16   Ht 1.676 m (5' 6\")   Wt 67.8 kg (149 lb 7.6 oz)   SpO2 97%   BMI 24.13 kg/m²     "

## 2022-01-27 NOTE — ED NOTES
"Pt awake, sitting upright in bed, speaking without signs of distress. States she feels as if her \"heart is racing\"  "

## 2022-01-27 NOTE — ED NOTES
Pt ambulatory to room with stable, steady gait. Continues to have nausea and dry heaves. No emesis noted at this time. Pt was noted to be drinking water from sink and from water bottle at time this RN made contact to bring her back to ED room. States last ETOH yesterday pm approx 1600. States previously had no ETOH for approx 5 months.

## 2022-01-27 NOTE — ED NOTES
Pt awake, standing and ambulating in room with stable gait. Speaking without signs of distress. States understanding of discharge instructions.

## 2022-01-27 NOTE — ED PROVIDER NOTES
ED Provider Note    Scribed for Pete Guerrier M.D. by Shraddha Peng. 2022  9:23 AM    Primary care provider: Pcp Pt States None  Means of arrival: Walk-in  History obtained from: Patient  History limited by: None    CHIEF COMPLAINT  Chief Complaint   Patient presents with    Nausea/Vomiting/Diarrhea     since yesterday morning    Alcohol Intoxication     last drink yesterday 1600     HPI  Juliette Snyder is a 31 y.o. female who presents to the Emergency Department for evaluation of constant nausea and active vomiting onset yesterday morning. Patient states that she has not drank for five months, 2 days ago, last 4 shots yesterday. EMS treated her with 4 mg Zofran en route. She admits to associated symptoms of diarrhea and cough, but denies fever. Patient denies chance of pregnancy. She adds she takes 0.5 mg klonopin BID for anxiety, and states she did not take her dose this morning.     REVIEW OF SYSTEMS  Pertinent positives include nausea, vomiting, diarrhea, and cough.   Pertinent negatives include no fever.    All other systems reviewed and negative.    PAST MEDICAL HISTORY   has a past medical history of Bronchitis, Hemorrhoids, and bladder infections.    SURGICAL HISTORY  patient denies any surgical history    SOCIAL HISTORY  Social History     Tobacco Use    Smoking status: Current Every Day Smoker     Packs/day: 0.50     Years: 9.00     Pack years: 4.50     Types: Cigarettes     Last attempt to quit: 10/19/2015     Years since quittin.2    Smokeless tobacco: Never Used   Substance Use Topics    Alcohol use: Yes    Drug use: No      Social History     Substance and Sexual Activity   Drug Use No       FAMILY HISTORY  Family History   Problem Relation Age of Onset    Other Mother         killed    Diabetes Maternal Grandfather     Hypertension Maternal Grandfather        CURRENT MEDICATIONS  Home Medications       Reviewed by Tamara Pace R.N. (Registered Nurse) on 22 at 0751  Med  "List Status: <None>     Medication Last Dose Status   5-Hydroxytryptophan (5-HTP PO)  Active   ibuprofen (MOTRIN) 800 MG Tab  Active   norethindrone (MICRONOR) 0.35 MG tablet  Active   Omega-3 Fatty Acids (FISH OIL) 1000 MG Cap capsule  Active   Prenatal MV-Min-Fe Fum-FA-DHA (PRENATAL 1 PO)  Active     0.5 mg klonopin BID               ALLERGIES  No Known Allergies    PHYSICAL EXAM  VITAL SIGNS: /97   Pulse 100   Temp 35.8 °C (96.5 °F) (Temporal)   Resp 16   Ht 1.676 m (5' 6\")   Wt 67.8 kg (149 lb 7.6 oz)   SpO2 97%   BMI 24.13 kg/m²     Nursing note and vitals reviewed.  Constitutional: Well-developed and well-nourished. Appears uncomfortable, Somewhat agitated.   HENT: Dry mucus membranes. Head is normocephalic and atraumatic. Oropharynx is clear and without exudate or erythema.   Eyes: Pupils are equal, round, and reactive to light. Conjunctiva are normal.   Cardiovascular: Tachycardic. No murmur heard. Normal radial pulses.  Pulmonary/Chest: Breath sounds normal. No wheezes or rales.   Abdominal: Soft and non-tender. No distention    Musculoskeletal: Extremities exhibit normal range of motion without edema or tenderness.   Neurological: Awake, alert and oriented to person, place, and time. No focal deficits noted.  Skin: Skin is warm and dry. No rash.   Psychiatric: Normal mood and affect. Appropriate for clinical situation.    DIAGNOSTIC STUDIES / PROCEDURES    LABS  Results for orders placed or performed during the hospital encounter of 01/27/22   CBC WITH DIFFERENTIAL   Result Value Ref Range    WBC 20.1 (H) 4.8 - 10.8 K/uL    RBC 4.75 4.20 - 5.40 M/uL    Hemoglobin 14.1 12.0 - 16.0 g/dL    Hematocrit 40.0 37.0 - 47.0 %    MCV 84.2 81.4 - 97.8 fL    MCH 29.7 27.0 - 33.0 pg    MCHC 35.3 (H) 33.6 - 35.0 g/dL    RDW 39.5 35.9 - 50.0 fL    Platelet Count 282 164 - 446 K/uL    MPV 10.8 9.0 - 12.9 fL    Neutrophils-Polys 83.10 (H) 44.00 - 72.00 %    Lymphocytes 11.30 (L) 22.00 - 41.00 %    Monocytes " 4.70 0.00 - 13.40 %    Eosinophils 0.00 0.00 - 6.90 %    Basophils 0.40 0.00 - 1.80 %    Immature Granulocytes 0.50 0.00 - 0.90 %    Nucleated RBC 0.00 /100 WBC    Neutrophils (Absolute) 16.65 (H) 2.00 - 7.15 K/uL    Lymphs (Absolute) 2.27 1.00 - 4.80 K/uL    Monos (Absolute) 0.94 (H) 0.00 - 0.85 K/uL    Eos (Absolute) 0.01 0.00 - 0.51 K/uL    Baso (Absolute) 0.09 0.00 - 0.12 K/uL    Immature Granulocytes (abs) 0.11 0.00 - 0.11 K/uL    NRBC (Absolute) 0.00 K/uL   COMP METABOLIC PANEL   Result Value Ref Range    Sodium 143 135 - 145 mmol/L    Potassium 3.3 (L) 3.6 - 5.5 mmol/L    Chloride 107 96 - 112 mmol/L    Co2 18 (L) 20 - 33 mmol/L    Anion Gap 18.0 (H) 7.0 - 16.0    Glucose 171 (H) 65 - 99 mg/dL    Bun 14 8 - 22 mg/dL    Creatinine 0.86 0.50 - 1.40 mg/dL    Calcium 8.7 8.5 - 10.5 mg/dL    AST(SGOT) 35 12 - 45 U/L    ALT(SGPT) 24 2 - 50 U/L    Alkaline Phosphatase 70 30 - 99 U/L    Total Bilirubin 0.8 0.1 - 1.5 mg/dL    Albumin 4.9 3.2 - 4.9 g/dL    Total Protein 7.7 6.0 - 8.2 g/dL    Globulin 2.8 1.9 - 3.5 g/dL    A-G Ratio 1.8 g/dL   LIPASE   Result Value Ref Range    Lipase 22 11 - 82 U/L   HCG QUAL SERUM   Result Value Ref Range    Beta-Hcg Qualitative Serum Negative Negative   URINALYSIS,CULTURE IF INDICATED    Specimen: Urine   Result Value Ref Range    Color DK Yellow     Character Cloudy (A)     Specific Gravity 1.037 <1.035    Ph 5.5 5.0 - 8.0    Glucose Negative Negative mg/dL    Ketones Trace (A) Negative mg/dL    Protein 100 (A) Negative mg/dL    Bilirubin Negative Negative    Urobilinogen, Urine 0.2 Negative    Nitrite Negative Negative    Leukocyte Esterase Trace (A) Negative    Occult Blood Moderate (A) Negative    Micro Urine Req Microscopic    ESTIMATED GFR   Result Value Ref Range    GFR If African American >60 >60 mL/min/1.73 m 2    GFR If Non African American >60 >60 mL/min/1.73 m 2   URINE MICROSCOPIC (W/UA)   Result Value Ref Range    WBC 5-10 (A) /hpf    RBC  (A) /hpf    Bacteria  Moderate (A) None /hpf    Epithelial Cells Many (A) /hpf     All labs reviewed by me.    COURSE & MEDICAL DECISION MAKING  Nursing notes, VS, PMSFHx reviewed in chart.     9:23 AM - Patient seen and examined at bedside. Patient will be treated with Ativan 1 mg injection, zofran 4 mg injection, and GI Cocktail 30 mL. Intravenous fluids were administered for dehydration.  Ordered Urrine microscopic w/UA, CBC w/diff, CMP, Lipase, HCG Qual serum, and UA if culture if indicated to evaluate her symptoms. I discussed plan for discharge following interventions and follow up as outlined below. The patient verbalizes they feel comfortable going home. The patient is stable for discharge after interventions and will return for any new or worsening symptoms. Patient verbalizes understanding and support with my plan for discharge.     9:54 AM - Nursing staff informed me the patient is still agitated. She will be treated with Ativan 2 mg injection.     10:24 AM - Urine sample appears to be contaminated, but patient is not having any urinary symptoms, so likely non-contributory.     HYDRATION: Based on the patient's presentation of Dehydration the patient was given IV fluids. IV Hydration was used because oral hydration was not adequate alone. Upon recheck following hydration, the patient was slightly improved.     The patient will return for new or worsening symptoms and is stable at the time of discharge.    DISPOSITION:  Patient will be discharged home in stable condition.    FOLLOW UP:  [facility], Emergency Dept  [address] [address] [phone]    If symptoms worsen      OUTPATIENT MEDICATIONS:  Discharge Medication List as of 1/27/2022 11:04 AM        START taking these medications    Details   ondansetron (ZOFRAN ODT) 4 MG TABLET DISPERSIBLE Take 1 Tablet by mouth every 8 hours as needed., Disp-10 Tablet, R-0, Normal      promethazine (PROMETHEGAN) 25 MG Suppos Insert 1 Suppository  into the rectum every 6 hours as needed for Nausea/Vomiting., Disp-10 Suppository, R-0, Normal             FINAL IMPRESSION  1. Nausea and vomiting, intractability of vomiting not specified, unspecified vomiting type    2. Alcohol abuse    3. Dehydration    4. Leukocytosis, unspecified type    5. Benzodiazepine withdrawal with complication (HCC)        Shraddha BONILLA (Scribe), am scribing for, and in the presence of, Pete Guerrier M.D..    Electronically signed by: Shraddha Peng (Scribe), 1/27/2022    IPete M.D. personally performed the services described in this documentation, as scribed by Shraddha Peng in my presence, and it is both accurate and complete. C.     The note accurately reflects work and decisions made by me.  Pete Guerrier M.D.  1/27/2022  11:37 AM

## 2022-01-27 NOTE — ED NOTES
Pt noted to be ambulatory and appears to be pacing in room. Speaking without signs of respiratory distress but states she does not feel well.

## 2022-01-27 NOTE — ED NOTES
Pt awake, sitting upright in bed, speaking without signs of distress. Noted by this RN to be using fingers in back of her throat to induce vomiting. Pt educated to not do this.

## 2022-01-29 LAB
BACTERIA UR CULT: NORMAL
SIGNIFICANT IND 70042: NORMAL
SITE SITE: NORMAL
SOURCE SOURCE: NORMAL

## 2022-03-21 ENCOUNTER — HOSPITAL ENCOUNTER (EMERGENCY)
Facility: MEDICAL CENTER | Age: 32
End: 2022-03-22
Attending: EMERGENCY MEDICINE
Payer: COMMERCIAL

## 2022-03-21 DIAGNOSIS — F32.A DEPRESSION, UNSPECIFIED DEPRESSION TYPE: ICD-10-CM

## 2022-03-21 DIAGNOSIS — F10.920 ALCOHOLIC INTOXICATION WITHOUT COMPLICATION (HCC): ICD-10-CM

## 2022-03-21 LAB
AMPHET UR QL SCN: NEGATIVE
BARBITURATES UR QL SCN: NEGATIVE
BENZODIAZ UR QL SCN: POSITIVE
BZE UR QL SCN: NEGATIVE
CANNABINOIDS UR QL SCN: NEGATIVE
METHADONE UR QL SCN: NEGATIVE
OPIATES UR QL SCN: NEGATIVE
OXYCODONE UR QL SCN: NEGATIVE
PCP UR QL SCN: NEGATIVE
POC BREATHALIZER: 0.12 PERCENT (ref 0–0.01)
POC BREATHALIZER: 0.26 PERCENT (ref 0–0.01)
PROPOXYPH UR QL SCN: NEGATIVE

## 2022-03-21 PROCEDURE — 99285 EMERGENCY DEPT VISIT HI MDM: CPT

## 2022-03-21 PROCEDURE — 700102 HCHG RX REV CODE 250 W/ 637 OVERRIDE(OP): Performed by: EMERGENCY MEDICINE

## 2022-03-21 PROCEDURE — 302970 POC BREATHALIZER: Performed by: EMERGENCY MEDICINE

## 2022-03-21 PROCEDURE — A9270 NON-COVERED ITEM OR SERVICE: HCPCS | Performed by: EMERGENCY MEDICINE

## 2022-03-21 PROCEDURE — 80307 DRUG TEST PRSMV CHEM ANLYZR: CPT

## 2022-03-21 RX ORDER — LORAZEPAM 1 MG/1
1 TABLET ORAL ONCE
Status: COMPLETED | OUTPATIENT
Start: 2022-03-21 | End: 2022-03-21

## 2022-03-21 RX ORDER — CLONAZEPAM 0.5 MG/1
0.5 TABLET ORAL ONCE
Status: COMPLETED | OUTPATIENT
Start: 2022-03-21 | End: 2022-03-21

## 2022-03-21 RX ORDER — HYDROXYZINE HYDROCHLORIDE 25 MG/1
25 TABLET, FILM COATED ORAL ONCE
Status: COMPLETED | OUTPATIENT
Start: 2022-03-21 | End: 2022-03-21

## 2022-03-21 RX ADMIN — HYDROXYZINE HYDROCHLORIDE 25 MG: 25 TABLET, FILM COATED ORAL at 19:59

## 2022-03-21 RX ADMIN — CLONAZEPAM 0.5 MG: 0.5 TABLET ORAL at 15:46

## 2022-03-21 RX ADMIN — CLONAZEPAM 0.5 MG: 0.5 TABLET ORAL at 18:27

## 2022-03-21 RX ADMIN — LORAZEPAM 1 MG: 1 TABLET ORAL at 22:05

## 2022-03-21 ASSESSMENT — FIBROSIS 4 INDEX: FIB4 SCORE: 0.84

## 2022-03-21 NOTE — ED TRIAGE NOTES
".  Chief Complaint   Patient presents with   • Legal 2000     Placed on legal hold by [employer].      Pt BIB [facility] and [employer] on a legal hold. Pt allegedly called her ex- and said she was suicidal and has a superficial laceration to left neck from a razor bleeding controlled (per [employer]). Pt denies SI/HI and states the laceration is from microblading her face/neck and accidental.  Pt appears intoxicated and admits to ETOH.  Pt is upset and asking for belongings. EMS administered 2mg versed IM prior to arrival.    Pt belongings have been removed and she is in a hospital gown. Warm blankets provided.    ./108   Pulse (!) 116   Temp 37.3 °C (99.2 °F) (Temporal)   Resp 20   Ht 1.676 m (5' 6\")   Wt 68 kg (150 lb)   LMP  (LMP Unknown)   SpO2 96%   BMI 24.21 kg/m²     "

## 2022-03-21 NOTE — ED PROVIDER NOTES
ER Provider Note     Scribed for Celio Edmonds M.D. by Cinthia Zaman. 3/21/2022, 2:28 PM.    Primary Care Provider: Pcp Pt States None  Means of Arrival: Ambulance   History obtained from: Patient  History limited by: None     CHIEF COMPLAINT  Chief Complaint   Patient presents with    Legal 2000     Placed on legal hold by [employer].     Alcohol Intoxication       HPI  Juliette Snyder is a 31 y.o. female who presents to the Emergency Department for evaluation of suicidal ideation. The patient called her boyfriend and told him that she was suicidal. She was then found to have a superficial abrasion to the left neck. The patient states that she accidentally cut herself while microbladng her face. She denies fever. No alleviating factors were reported. The patient is currently slurring her speech and appears intoxicated. She admits to drinking alcohol. EMS administered 2 mg versed en route to ED.        REVIEW OF SYSTEMS  See HPI for further details. All other systems are negative.   Pertinent positives include suicidal ideation. Pertinent negatives include no fever.         PAST MEDICAL HISTORY   has a past medical history of Bronchitis, Hemorrhoids, and bladder infections.    SURGICAL HISTORY  patient denies any surgical history    SOCIAL HISTORY  Social History     Tobacco Use    Smoking status: Current Every Day Smoker     Packs/day: 0.50     Years: 9.00     Pack years: 4.50     Types: Cigarettes     Last attempt to quit: 10/19/2015     Years since quittin.4    Smokeless tobacco: Never Used   Substance Use Topics    Alcohol use: Yes     Comment: occ    Drug use: No      Social History     Substance and Sexual Activity   Drug Use No       FAMILY HISTORY  Family History   Problem Relation Age of Onset    Other Mother         killed    Diabetes Maternal Grandfather     Hypertension Maternal Grandfather        CURRENT MEDICATIONS  Home Medications       Reviewed by Gela Crockett R.N. (Registered Nurse) on 22  "at 1421  Med List Status: <None>     Medication Last Dose Status   5-Hydroxytryptophan (5-HTP PO)  Active   ibuprofen (MOTRIN) 800 MG Tab  Active   norethindrone (MICRONOR) 0.35 MG tablet  Active   Omega-3 Fatty Acids (FISH OIL) 1000 MG Cap capsule  Active   ondansetron (ZOFRAN ODT) 4 MG TABLET DISPERSIBLE  Active   Prenatal MV-Min-Fe Fum-FA-DHA (PRENATAL 1 PO)  Active   promethazine (PROMETHEGAN) 25 MG Suppos  Active                    ALLERGIES  No Known Allergies    PHYSICAL EXAM  VITAL SIGNS: /108   Pulse (!) 116   Temp 37.3 °C (99.2 °F) (Temporal)   Resp 20   Ht 1.676 m (5' 6\")   Wt 68 kg (150 lb)   LMP  (LMP Unknown)   SpO2 96%   BMI 24.21 kg/m²      Constitutional: Slurring words  HENT: No signs of trauma, Bilateral external ears normal, Nose normal.   Eyes: Pupils are equal and reactive, Conjunctiva normal, Non-icteric.   Neck: Normal range of motion, No tenderness, Supple, No stridor.   Lymphatic: No lymphadenopathy noted.   Cardiovascular: Regular rate and rhythm, no palpable thrill  Thorax & Lungs: No respiratory distress,  No chest tenderness.  CTAB  Abdomen: Bowel sounds normal, Soft, No tenderness, No masses, No pulsatile masses. No peritoneal signs.  Skin: 3cm linear abrasion to left neck. Warm, Dry, No erythema, No rash.   Back: No bony tenderness, No CVA tenderness.   Extremities: Intact distal pulses, No edema, No tenderness, No cyanosis.  Musculoskeletal: Good range of motion in all major joints. No tenderness to palpation or major deformities noted.   Neurologic: Slurring words  Psychiatric: Slurring words      DIAGNOSTIC STUDIES / PROCEDURES    LABS  Labs Reviewed   URINE DRUG SCREEN - Abnormal; Notable for the following components:       Result Value    Benzodiazepines Positive (*)     All other components within normal limits   POC BREATHALIZER - Abnormal; Notable for the following components:    POC Breathalizer 0.256 (*)     All other components within normal limits   POC " BREATHALIZER   POC BREATHALIZER   POC BREATHALIZER     All labs reviewed by me.      COURSE & MEDICAL DECISION MAKING  Pertinent Labs & Imaging studies reviewed. (See chart for details)    This is a 31 y.o. female that presents intoxicated.  She had mentions of suicidal ideation and abrasion on her neck.  At this time she denies suicidal ideation but is very intoxicated and had a conversation with her boyfriend stating her suicidality.  At this time we will allow the patient to sober up and reassess her suicidality..     2:28 PM - Patient seen and examined at bedside. Ordered POC breathalizer and Urine drug screen.      3:42 PM - Informed patient that she will need to stay in the hospital.     3:44PM - Ordered Klonopin 0.5 mg.     Admitted to ED observation at  3/21/2022, 6:39 PM for reassessment of suicidal ideation and alcohol intoxication.      Patient has been reassessed multiple times in the still intoxicated.  She is required multiple medications to help with her anxiety.  We will have the oncoming emergency physician continue to reassess her until she is sober and then should be seen by the behavioral health team.    FINAL IMPRESSION  1. Alcoholic intoxication without complication (HCC)    2. Depression, unspecified depression type          I, Cinthia Zaman (Paola), am scribing for, and in the presence of, Celio Edmonds M.D..    Electronically signed by: Cinthia Zaman (Gageibdanielle), 3/21/2022    ICelio M.D. personally performed the services described in this documentation, as scribed by Cinthia Zaman in my presence, and it is both accurate and complete.     The note accurately reflects work and decisions made by me.  Celio Edmonds M.D.  3/21/2022  9:17 PM

## 2022-03-21 NOTE — CONSULTS
Attempted to see the patient for Alert Team Consult she was uncooperative and aggressive. Informed RN that the patient would not be assessed until NOC shift arrived if I could not see the patient now, SEEMA Ren agreed patients current BAL is .22 at 1600, will have noc shift assess.

## 2022-03-21 NOTE — ED NOTES
Pt out of room yelling at staff stating she wants to leave and demanding her belongings. ERP at bedside.

## 2022-03-22 VITALS
RESPIRATION RATE: 17 BRPM | OXYGEN SATURATION: 98 % | BODY MASS INDEX: 24.11 KG/M2 | WEIGHT: 150 LBS | HEIGHT: 66 IN | SYSTOLIC BLOOD PRESSURE: 138 MMHG | TEMPERATURE: 97.6 F | HEART RATE: 104 BPM | DIASTOLIC BLOOD PRESSURE: 88 MMHG

## 2022-03-22 LAB
POC BREATHALIZER: 0.07 PERCENT (ref 0–0.01)
POC BREATHALIZER: 0.09 PERCENT (ref 0–0.01)

## 2022-03-22 PROCEDURE — 302970 POC BREATHALIZER: Performed by: EMERGENCY MEDICINE

## 2022-03-22 PROCEDURE — 90791 PSYCH DIAGNOSTIC EVALUATION: CPT
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
## 2022-03-22 NOTE — DISCHARGE PLANNING
"    [facility]  Crisis/Safety Plan    Name:  Juliette Sndyer  MRN:  [id number]  Date:  3/22/2022    Warning signs that a crisis may be developing for me or I may be at risk:  1) anxiety  2) tearful  3) drinking    Coping strategies I can use on my own (relaxation, physical activity, etc):  1) reading my NA book  2) Netflix  3) talk to my boyfriend     Ways I can make my environment safe:  1) secure all medications  2) secure all sharps  3) no forearms    Things I want to tell myself when I feel a crisis developin) \"I'm a good mother.\"  2) \"I can get through this.\"  3) validate feelings    People I can contact for support or distraction (and their phone numbers):  1) Sponser  2) boyfriend  3)    If I’m not able to reach my support people, or the above strategies don’t help, I can contact the following professionals, agencies, or hotlines:  1) Crisis Call Center ():  [phone] -OR- [phone]  2) Crisis Text Line ():  Text CARE TO 008764  3)   4)     Chelsie Khan R.N.    "

## 2022-03-22 NOTE — ED NOTES
"Pt crying, reports anxiety.  Pt adamantly denying SI, \"states I have a kid, I don't want to die\"  Asking for hydroxyzine- MD notified.   Pt continuously saying \"I just really want to go home.\"  "

## 2022-03-22 NOTE — DISCHARGE SUMMARY
"  ED Observation Discharge Summary    Patient:Juliette Snyder  Patient : 1990  Patient MRN: [id number]  Patient PCP: Pcp Pt States None    Admit Date: 3/21/2022  Discharge Date and Time: 22 0130  Discharge Diagnosis:   1. Alcoholic intoxication without complication (HCC)    2. Depression, unspecified depression type       Discharge Attending: Mundo Reynolds M.D.  Discharge Service: ED Observation    ED Course  Juliette is a 31 y.o. female who was evaluated at [facility] for Suicidal ideation.  The patient did have an abrasion to her left neck.  She was found to be intoxicated.  The patient was monitored in the emergency department until sober and evaluated by the alert team.  The patient is able to contract for safety, is forward thinking, denies any ongoing suicidal ideation.  At this time, patient appears to be stable for discharge with outpatient mental health resources.    Discharge Exam:  /88   Pulse (!) 104   Temp 37.3 °C (99.2 °F) (Temporal)   Resp 17   Ht 1.676 m (5' 6\")   Wt 68 kg (150 lb)   LMP  (LMP Unknown)   SpO2 98%   BMI 24.21 kg/m² .    Constitutional: Awake and alert. Nontoxic  HENT:  Grossly normal  Eyes: Grossly normal  Neck: Normal range of motion  Cardiovascular: No JVD, RRR   Thorax & Lungs: No respiratory distress  Abdomen: Non-distended  Skin:  No pathologic rash.   Extremities: Well perfused  Psychiatric: Affect normal    Labs  Labs Reviewed   URINE DRUG SCREEN - Abnormal; Notable for the following components:       Result Value    Benzodiazepines Positive (*)     All other components within normal limits   POC BREATHALIZER - Abnormal; Notable for the following components:    POC Breathalizer 0.256 (*)     All other components within normal limits   POC BREATHALIZER - Abnormal; Notable for the following components:    POC Breathalizer 0.085 (*)     All other components within normal limits   POC BREATHALIZER - Abnormal; Notable for the following " components:    POC Breathalizer 0.124 (*)     All other components within normal limits   POC BREATHALIZER - Abnormal; Notable for the following components:    POC Breathalizer 0.069 (*)     All other components within normal limits   POC BREATHALIZER   POC BREATHALIZER   POC BREATHALIZER   POC BREATHALIZER   POC BREATHALIZER   POC BREATHALIZER   POC BREATHALIZER   POC BREATHALIZER   POC BREATHALIZER   POC BREATHALIZER   POC BREATHALIZER   POC BREATHALIZER           Disposition: Home in stable condition    Follow up: [facility], Emergency Dept  [address] [place] [id number] [phone]    If symptoms worsen    Your regular doctor.  To establish a primary care provider within our system, please call [phone]

## 2022-03-22 NOTE — ED NOTES
Pt sitting at edge of bed asking for breathalyzer so she can leave. Informed pt POC breathalyzer is Q2H   Pt verbalized understanding

## 2022-12-29 ENCOUNTER — HOSPITAL ENCOUNTER (EMERGENCY)
Facility: MEDICAL CENTER | Age: 32
End: 2022-12-29
Attending: STUDENT IN AN ORGANIZED HEALTH CARE EDUCATION/TRAINING PROGRAM

## 2022-12-29 VITALS
HEIGHT: 66 IN | HEART RATE: 110 BPM | DIASTOLIC BLOOD PRESSURE: 82 MMHG | SYSTOLIC BLOOD PRESSURE: 130 MMHG | WEIGHT: 140 LBS | TEMPERATURE: 98.4 F | OXYGEN SATURATION: 95 % | BODY MASS INDEX: 22.5 KG/M2 | RESPIRATION RATE: 16 BRPM

## 2022-12-29 DIAGNOSIS — F41.9 ANXIETY: ICD-10-CM

## 2022-12-29 DIAGNOSIS — F10.920 ALCOHOLIC INTOXICATION WITHOUT COMPLICATION (HCC): ICD-10-CM

## 2022-12-29 DIAGNOSIS — T50.901A ACCIDENTAL DRUG OVERDOSE, INITIAL ENCOUNTER: ICD-10-CM

## 2022-12-29 DIAGNOSIS — R45.1 AGITATION: ICD-10-CM

## 2022-12-29 DIAGNOSIS — N39.0 URINARY TRACT INFECTION WITHOUT HEMATURIA, SITE UNSPECIFIED: ICD-10-CM

## 2022-12-29 LAB
ALBUMIN SERPL BCP-MCNC: 4.6 G/DL (ref 3.2–4.9)
ALBUMIN/GLOB SERPL: 1.6 G/DL
ALP SERPL-CCNC: 76 U/L (ref 30–99)
ALT SERPL-CCNC: 26 U/L (ref 2–50)
AMPHET UR QL SCN: NEGATIVE
ANION GAP SERPL CALC-SCNC: 18 MMOL/L (ref 7–16)
APAP SERPL-MCNC: <5 UG/ML (ref 10–30)
APPEARANCE UR: ABNORMAL
AST SERPL-CCNC: 37 U/L (ref 12–45)
BACTERIA #/AREA URNS HPF: ABNORMAL /HPF
BARBITURATES UR QL SCN: NEGATIVE
BASOPHILS # BLD AUTO: 1.1 % (ref 0–1.8)
BASOPHILS # BLD: 0.08 K/UL (ref 0–0.12)
BENZODIAZ UR QL SCN: POSITIVE
BILIRUB SERPL-MCNC: 0.3 MG/DL (ref 0.1–1.5)
BILIRUB UR QL STRIP.AUTO: NEGATIVE
BUN SERPL-MCNC: 9 MG/DL (ref 8–22)
BZE UR QL SCN: NEGATIVE
CALCIUM ALBUM COR SERPL-MCNC: 8.1 MG/DL (ref 8.5–10.5)
CALCIUM SERPL-MCNC: 8.6 MG/DL (ref 8.5–10.5)
CANNABINOIDS UR QL SCN: NEGATIVE
CHLORIDE SERPL-SCNC: 105 MMOL/L (ref 96–112)
CO2 SERPL-SCNC: 19 MMOL/L (ref 20–33)
COLOR UR: YELLOW
CREAT SERPL-MCNC: 0.66 MG/DL (ref 0.5–1.4)
EKG IMPRESSION: NORMAL
EOSINOPHIL # BLD AUTO: 0.04 K/UL (ref 0–0.51)
EOSINOPHIL NFR BLD: 0.5 % (ref 0–6.9)
EPI CELLS #/AREA URNS HPF: NEGATIVE /HPF
ERYTHROCYTE [DISTWIDTH] IN BLOOD BY AUTOMATED COUNT: 44.3 FL (ref 35.9–50)
GFR SERPLBLD CREATININE-BSD FMLA CKD-EPI: 117 ML/MIN/1.73 M 2
GLOBULIN SER CALC-MCNC: 2.9 G/DL (ref 1.9–3.5)
GLUCOSE SERPL-MCNC: 155 MG/DL (ref 65–99)
GLUCOSE UR STRIP.AUTO-MCNC: NEGATIVE MG/DL
HCG SERPL QL: NEGATIVE
HCT VFR BLD AUTO: 41 % (ref 37–47)
HGB BLD-MCNC: 14 G/DL (ref 12–16)
HYALINE CASTS #/AREA URNS LPF: ABNORMAL /LPF
IMM GRANULOCYTES # BLD AUTO: 0.02 K/UL (ref 0–0.11)
IMM GRANULOCYTES NFR BLD AUTO: 0.3 % (ref 0–0.9)
KETONES UR STRIP.AUTO-MCNC: NEGATIVE MG/DL
LEUKOCYTE ESTERASE UR QL STRIP.AUTO: ABNORMAL
LYMPHOCYTES # BLD AUTO: 2.64 K/UL (ref 1–4.8)
LYMPHOCYTES NFR BLD: 35.2 % (ref 22–41)
MCH RBC QN AUTO: 30.2 PG (ref 27–33)
MCHC RBC AUTO-ENTMCNC: 34.1 G/DL (ref 33.6–35)
MCV RBC AUTO: 88.4 FL (ref 81.4–97.8)
METHADONE UR QL SCN: NEGATIVE
MICRO URNS: ABNORMAL
MONOCYTES # BLD AUTO: 0.59 K/UL (ref 0–0.85)
MONOCYTES NFR BLD AUTO: 7.9 % (ref 0–13.4)
NEUTROPHILS # BLD AUTO: 4.13 K/UL (ref 2–7.15)
NEUTROPHILS NFR BLD: 55 % (ref 44–72)
NITRITE UR QL STRIP.AUTO: NEGATIVE
NRBC # BLD AUTO: 0 K/UL
NRBC BLD-RTO: 0 /100 WBC
OPIATES UR QL SCN: NEGATIVE
OXYCODONE UR QL SCN: NEGATIVE
PCP UR QL SCN: NEGATIVE
PH UR STRIP.AUTO: 6 [PH] (ref 5–8)
PLATELET # BLD AUTO: 332 K/UL (ref 164–446)
PMV BLD AUTO: 11.2 FL (ref 9–12.9)
POC BREATHALIZER: 0.24 PERCENT (ref 0–0.01)
POTASSIUM SERPL-SCNC: 3.5 MMOL/L (ref 3.6–5.5)
PROPOXYPH UR QL SCN: NEGATIVE
PROT SERPL-MCNC: 7.5 G/DL (ref 6–8.2)
PROT UR QL STRIP: 100 MG/DL
RBC # BLD AUTO: 4.64 M/UL (ref 4.2–5.4)
RBC # URNS HPF: ABNORMAL /HPF
RBC UR QL AUTO: ABNORMAL
SALICYLATES SERPL-MCNC: <1 MG/DL (ref 15–25)
SODIUM SERPL-SCNC: 142 MMOL/L (ref 135–145)
SP GR UR STRIP.AUTO: 1.02
UROBILINOGEN UR STRIP.AUTO-MCNC: 0.2 MG/DL
WBC # BLD AUTO: 7.5 K/UL (ref 4.8–10.8)
WBC #/AREA URNS HPF: ABNORMAL /HPF

## 2022-12-29 PROCEDURE — 700105 HCHG RX REV CODE 258: Performed by: STUDENT IN AN ORGANIZED HEALTH CARE EDUCATION/TRAINING PROGRAM

## 2022-12-29 PROCEDURE — A9270 NON-COVERED ITEM OR SERVICE: HCPCS | Performed by: EMERGENCY MEDICINE

## 2022-12-29 PROCEDURE — 700111 HCHG RX REV CODE 636 W/ 250 OVERRIDE (IP): Performed by: STUDENT IN AN ORGANIZED HEALTH CARE EDUCATION/TRAINING PROGRAM

## 2022-12-29 PROCEDURE — 700102 HCHG RX REV CODE 250 W/ 637 OVERRIDE(OP): Performed by: EMERGENCY MEDICINE

## 2022-12-29 PROCEDURE — 36415 COLL VENOUS BLD VENIPUNCTURE: CPT

## 2022-12-29 PROCEDURE — 80307 DRUG TEST PRSMV CHEM ANLYZR: CPT

## 2022-12-29 PROCEDURE — 80143 DRUG ASSAY ACETAMINOPHEN: CPT

## 2022-12-29 PROCEDURE — 700111 HCHG RX REV CODE 636 W/ 250 OVERRIDE (IP): Performed by: EMERGENCY MEDICINE

## 2022-12-29 PROCEDURE — 96376 TX/PRO/DX INJ SAME DRUG ADON: CPT

## 2022-12-29 PROCEDURE — 700105 HCHG RX REV CODE 258: Performed by: EMERGENCY MEDICINE

## 2022-12-29 PROCEDURE — 96374 THER/PROPH/DIAG INJ IV PUSH: CPT

## 2022-12-29 PROCEDURE — 85025 COMPLETE CBC W/AUTO DIFF WBC: CPT

## 2022-12-29 PROCEDURE — 80179 DRUG ASSAY SALICYLATE: CPT

## 2022-12-29 PROCEDURE — 99285 EMERGENCY DEPT VISIT HI MDM: CPT

## 2022-12-29 PROCEDURE — 80053 COMPREHEN METABOLIC PANEL: CPT

## 2022-12-29 PROCEDURE — 81001 URINALYSIS AUTO W/SCOPE: CPT

## 2022-12-29 PROCEDURE — 700111 HCHG RX REV CODE 636 W/ 250 OVERRIDE (IP)

## 2022-12-29 PROCEDURE — 93005 ELECTROCARDIOGRAM TRACING: CPT

## 2022-12-29 PROCEDURE — 84703 CHORIONIC GONADOTROPIN ASSAY: CPT

## 2022-12-29 PROCEDURE — 96375 TX/PRO/DX INJ NEW DRUG ADDON: CPT

## 2022-12-29 PROCEDURE — 302970 POC BREATHALIZER

## 2022-12-29 PROCEDURE — 93005 ELECTROCARDIOGRAM TRACING: CPT | Performed by: STUDENT IN AN ORGANIZED HEALTH CARE EDUCATION/TRAINING PROGRAM

## 2022-12-29 RX ORDER — CEFDINIR 300 MG/1
300 CAPSULE ORAL ONCE
Status: COMPLETED | OUTPATIENT
Start: 2022-12-29 | End: 2022-12-29

## 2022-12-29 RX ORDER — LORAZEPAM 2 MG/ML
2 INJECTION INTRAMUSCULAR ONCE
Status: COMPLETED | OUTPATIENT
Start: 2022-12-29 | End: 2022-12-29

## 2022-12-29 RX ORDER — SODIUM CHLORIDE 9 MG/ML
1000 INJECTION, SOLUTION INTRAVENOUS ONCE
Status: COMPLETED | OUTPATIENT
Start: 2022-12-29 | End: 2022-12-29

## 2022-12-29 RX ORDER — MODAFINIL 100 MG/1
100 TABLET ORAL DAILY
COMMUNITY

## 2022-12-29 RX ORDER — CEFDINIR 300 MG/1
300 CAPSULE ORAL 2 TIMES DAILY
Qty: 10 CAPSULE | Refills: 0 | Status: SHIPPED | OUTPATIENT
Start: 2022-12-29 | End: 2023-01-03

## 2022-12-29 RX ORDER — LORAZEPAM 2 MG/ML
1 INJECTION INTRAMUSCULAR ONCE
Status: COMPLETED | OUTPATIENT
Start: 2022-12-29 | End: 2022-12-29

## 2022-12-29 RX ORDER — BUPROPION HYDROCHLORIDE 300 MG/1
300 TABLET ORAL EVERY MORNING
COMMUNITY

## 2022-12-29 RX ORDER — CLONAZEPAM 0.5 MG/1
.5-1 TABLET ORAL 3 TIMES DAILY
COMMUNITY

## 2022-12-29 RX ORDER — HALOPERIDOL 5 MG/ML
INJECTION INTRAMUSCULAR
Status: COMPLETED
Start: 2022-12-29 | End: 2022-12-29

## 2022-12-29 RX ORDER — HALOPERIDOL 5 MG/ML
5 INJECTION INTRAMUSCULAR ONCE
Status: COMPLETED | OUTPATIENT
Start: 2022-12-29 | End: 2022-12-29

## 2022-12-29 RX ADMIN — HALOPERIDOL 5 MG: 5 INJECTION INTRAMUSCULAR at 03:45

## 2022-12-29 RX ADMIN — CEFDINIR 300 MG: 300 CAPSULE ORAL at 11:08

## 2022-12-29 RX ADMIN — LORAZEPAM 1 MG: 2 INJECTION INTRAMUSCULAR; INTRAVENOUS at 11:08

## 2022-12-29 RX ADMIN — SODIUM CHLORIDE 1000 ML: 9 INJECTION, SOLUTION INTRAVENOUS at 09:22

## 2022-12-29 RX ADMIN — LORAZEPAM 2 MG: 2 INJECTION INTRAMUSCULAR; INTRAVENOUS at 03:45

## 2022-12-29 RX ADMIN — SODIUM CHLORIDE 1000 ML: 9 INJECTION, SOLUTION INTRAVENOUS at 03:45

## 2022-12-29 RX ADMIN — HALOPERIDOL LACTATE 5 MG: 5 INJECTION, SOLUTION INTRAMUSCULAR at 03:45

## 2022-12-29 RX ADMIN — LORAZEPAM 1 MG: 2 INJECTION INTRAMUSCULAR; INTRAVENOUS at 09:16

## 2022-12-29 ASSESSMENT — FIBROSIS 4 INDEX: FIB4 SCORE: 0.86

## 2022-12-29 NOTE — ED NOTES
"Patient ambulatory to door of room with steady gait. A & O X 4, GCS 15. Calm and cooperative in conversation. States \"I would like to check myself out.\"  "

## 2022-12-29 NOTE — ED NOTES
Pt appears to now be sleeping. Security @ bedside for partial restraint removal. Pt now in 2pt hard restraints. Pt right ankle and left wrist remain restrained. Pt placed on 2L NC d/t desatting on RA

## 2022-12-29 NOTE — ED NOTES
Report received from SEEMA CHAVIS.    Patient A & O x 4, GCS 15. Calm, cooperative, and conversing appropriately with staff. Ambulatory to and from BR with steady gait.

## 2022-12-29 NOTE — ED TRIAGE NOTES
"Chief Complaint   Patient presents with    Alcohol Intoxication     Pt reports that she drank a large amount of vodka tonight at a friends house. She then had \"night terrors and stopped breathing\" Pt GCS 15 on arrival to ED       Drug Overdose     Pt reportedly took apx 20x 0.5mg Klonopin pills over the course of apx 24 hours. Pt denies SI HI at this time but states she took so many d/t anxiety of having to see her parents      BIB [facility] for above complaint. Pt continually denies SI/HI states that she has hx of anxiety and depression with prior SI attempts. Pt states that she feels tired but otherwise denies any other sx.   Pt received aps 300mL NS PTA. EKG ordered d/t tachycardia    BP (!) 138/94   Pulse (!) 130   Temp 36.8 °C (98.2 °F) (Temporal)   Resp 15   Ht 1.676 m (5' 6\")   Wt 63.5 kg (140 lb)   SpO2 95%   BMI 22.60 kg/m²     "

## 2022-12-29 NOTE — DISCHARGE SUMMARY
"  ED Observation Discharge Summary    Patient:Juliette Snyder  Patient : 1990  Patient MRN: [id number]  Patient PCP: Pcp Pt States None    Admit Date: 2022  Discharge Date and Time: 22 8:38 AM  Discharge Diagnosis: Alcohol intoxication, anxiety, urinary tract infection  Discharge Attending: Syed Hoff D.O.  Discharge Service: ED Observation    ED Course  Juliette is a 32 y.o. female who was evaluated at [facility] for alcohol intoxication anxiety.  Initially patient is extremely agitated, belligerent needed Ativan and Haldol for sedation.  She slept through the evening without difficulty.  She woke up this morning is slightly tachycardic and anxious.  She received 2 mg of Ativan and 1 L normal saline IV fluid bolus.  Following that she had significant improvement had a normal heart rate, she was less tremulous and is feeling better.  In addition, the patient does have evidence of urinary tract infection and she received cefdinir here and a prescription for the same as an outpatient.  The patient was going home.    Discharge Exam:  /82   Pulse (!) 110   Temp 36.8 °C (98.2 °F) (Temporal)   Resp 16   Ht 1.676 m (5' 6\")   Wt 63.5 kg (140 lb)   SpO2 95%   BMI 22.60 kg/m² .    Constitutional: Awake and alert. Nontoxic  HENT:  Grossly normal  Eyes: Grossly normal  Neck: Normal range of motion  Cardiovascular: Normal heart rate   Thorax & Lungs: No respiratory distress  Abdomen: Nontender  Skin:  No pathologic rash.   Extremities: Well perfused  Psychiatric: Anxious    Labs  Results for orders placed or performed during the hospital encounter of 22   CBC WITH DIFFERENTIAL   Result Value Ref Range    WBC 7.5 4.8 - 10.8 K/uL    RBC 4.64 4.20 - 5.40 M/uL    Hemoglobin 14.0 12.0 - 16.0 g/dL    Hematocrit 41.0 37.0 - 47.0 %    MCV 88.4 81.4 - 97.8 fL    MCH 30.2 27.0 - 33.0 pg    MCHC 34.1 33.6 - 35.0 g/dL    RDW 44.3 35.9 - 50.0 fL    Platelet Count 332 164 - " 446 K/uL    MPV 11.2 9.0 - 12.9 fL    Neutrophils-Polys 55.00 44.00 - 72.00 %    Lymphocytes 35.20 22.00 - 41.00 %    Monocytes 7.90 0.00 - 13.40 %    Eosinophils 0.50 0.00 - 6.90 %    Basophils 1.10 0.00 - 1.80 %    Immature Granulocytes 0.30 0.00 - 0.90 %    Nucleated RBC 0.00 /100 WBC    Neutrophils (Absolute) 4.13 2.00 - 7.15 K/uL    Lymphs (Absolute) 2.64 1.00 - 4.80 K/uL    Monos (Absolute) 0.59 0.00 - 0.85 K/uL    Eos (Absolute) 0.04 0.00 - 0.51 K/uL    Baso (Absolute) 0.08 0.00 - 0.12 K/uL    Immature Granulocytes (abs) 0.02 0.00 - 0.11 K/uL    NRBC (Absolute) 0.00 K/uL   COMP METABOLIC PANEL   Result Value Ref Range    Sodium 142 135 - 145 mmol/L    Potassium 3.5 (L) 3.6 - 5.5 mmol/L    Chloride 105 96 - 112 mmol/L    Co2 19 (L) 20 - 33 mmol/L    Anion Gap 18.0 (H) 7.0 - 16.0    Glucose 155 (H) 65 - 99 mg/dL    Bun 9 8 - 22 mg/dL    Creatinine 0.66 0.50 - 1.40 mg/dL    Calcium 8.6 8.5 - 10.5 mg/dL    AST(SGOT) 37 12 - 45 U/L    ALT(SGPT) 26 2 - 50 U/L    Alkaline Phosphatase 76 30 - 99 U/L    Total Bilirubin 0.3 0.1 - 1.5 mg/dL    Albumin 4.6 3.2 - 4.9 g/dL    Total Protein 7.5 6.0 - 8.2 g/dL    Globulin 2.9 1.9 - 3.5 g/dL    A-G Ratio 1.6 g/dL   HCG QUAL SERUM   Result Value Ref Range    Beta-Hcg Qualitative Serum Negative Negative   CORRECTED CALCIUM   Result Value Ref Range    Correct Calcium 8.1 (L) 8.5 - 10.5 mg/dL   ESTIMATED GFR   Result Value Ref Range    GFR (CKD-EPI) 117 >60 mL/min/1.73 m 2   Salicylate   Result Value Ref Range    Salicylates, Quant. <1.0 (L) 15.0 - 25.0 mg/dL   ACETAMINOPHEN   Result Value Ref Range    Acetaminophen -Tylenol <5.0 (L) 10.0 - 30.0 ug/mL   POC BREATHALIZER   Result Value Ref Range    POC Breathalizer 0.240 (A) 0.00 - 0.01 Percent   EKG   Result Value Ref Range    Report       [facility] Emergency Dept.    Test Date:  2022-12-29  Pt Name:    ROYCENORM GRIFFIN                 Department: ER  MRN:        [id number]                      Room:         30  Gender:     Female                       Technician: [id number]  :        1990                   Requested By:ER TRIAGE PROTOCOL  Order #:    [id number]                    Reading MD:    Measurements  Intervals                                Axis  Rate:       134                          P:          129  NE:         106                          QRS:        3  QRSD:       131                          T:          44  QT:         360  QTc:        538    Interpretive Statements  Sinus tachycardia  Left bundle branch block  No previous ECG available for comparison             Disposition: Discharge    Follow up: Primary care physician as well as outpatient rehabilitation services    Medications:   New Prescriptions    No medications on file       Discharge Condition: Stable    Electronically signed by: Syed Hoff D.O., 2022 8:38 AM

## 2022-12-29 NOTE — ED NOTES
Upon arrival, pt appears to be somewhat somnolent but cooperative. Pt then suddenly became agitated, stood up and began ripping off equipment, including IV tubing that she ripped in half. Pt ran into ERP while shouting that she needed to take more Klonopin and needed something to calm down. Pt not verbally redirectable at this time. Pt assisted back to [facility] by ED tech. Pt then medicated with 5mg IV Haldol per ERP verbal order. Pt then began punching, kicking and attempting to bite staff while also flailing her extremities. Pt medicated with 2mg IV ativan and placed in 4pt hard restraints and spit zamudio.

## 2022-12-29 NOTE — ED NOTES
Restraints removed by security. Pt appears to be resting calmly in r[place], VSS, NAD. Pt educated on restraint criteria and necessary behavior required to stay out of restraints.

## 2022-12-29 NOTE — DISCHARGE INSTRUCTIONS
Please follow-up with your primary care physician for further evaluation management.  Recommend that you see some drinking alcohol as you have an adverse reaction to this.

## 2022-12-29 NOTE — ED NOTES
Pt appears to be sleeping in rney. VSS, NAD. Pt remains in 2pt restraints. Pt has no additional requests at this time

## 2022-12-29 NOTE — ED PROVIDER NOTES
"ED Provider Note        CHIEF COMPLAINT  Chief Complaint   Patient presents with    Alcohol Intoxication     Pt reports that she drank a large amount of vodka tonight at a friends house. She then had \"night terrors and stopped breathing\" Pt GCS 15 on arrival to ED       Drug Overdose     Pt reportedly took apx 20x 0.5mg Klonopin pills over the course of apx 24 hours. Pt denies SI HI at this time but states she took so many d/t anxiety of having to see her parents        HPI  Juliette Snyder is a 32 y.o. female who presents with alcohol intoxication.  Per EMS she drank a large amount of vodka at a friend's house earlier tonight.  She then had ' night terrors and stopped breathing'.  She also reportedly took 20 x 0.5 mg grams of klonopin pills over the course of 24 hours.  She said that this was due to anxiety and denies any suicidal intent.  Patient unable to provide any history she is tearful, crying on my assessment and quickly becomes agitated and aggressive.     REVIEW OF SYSTEMS  Unable to obtain    PAST MEDICAL HISTORY     Past Medical History:   Diagnosis Date    Bronchitis     Hemorrhoids     Hx of bladder infections        SOCIAL HISTORY  Social History     Tobacco Use    Smoking status: Former     Packs/day: 0.50     Years: 9.00     Pack years: 4.50     Types: Cigarettes     Quit date: 10/19/2015     Years since quittin.2    Smokeless tobacco: Never   Substance Use Topics    Alcohol use: Yes     Comment: occ    Drug use: No       SURGICAL HISTORY  History reviewed. No pertinent surgical history.    CURRENT MEDICATIONS  Home Medications       Reviewed by Batsheva Mancilla R.N. (Registered Nurse) on 22 at 0318  Med List Status: Not Addressed     Medication Last Dose Status   5-Hydroxytryptophan (5-HTP PO)  Active   ibuprofen (MOTRIN) 800 MG Tab  Active   norethindrone (MICRONOR) 0.35 MG tablet  Active   Omega-3 Fatty Acids (FISH OIL) 1000 MG Cap capsule  Active   ondansetron (ZOFRAN ODT) 4 MG " "TABLET DISPERSIBLE  Active   Prenatal MV-Min-Fe Fum-FA-DHA (PRENATAL 1 PO)  Active   promethazine (PROMETHEGAN) 25 MG Suppos  Active                    ALLERGIES  No Known Allergies    PHYSICAL EXAM  VITAL SIGNS: /64   Pulse (!) 110   Temp 36.8 °C (98.2 °F) (Temporal)   Resp 16   Ht 1.676 m (5' 6\")   Wt 63.5 kg (140 lb)   SpO2 95%   BMI 22.60 kg/m²    Constitutional: Awake and alert . Crying hysterically    HENT: No signs of trauma  Eyes: Normal inspection  Neck: Grossly normal range of motion.  Cardiovascular: Tachycardic heart rate, Normal rhythm.   Thorax & Lungs: No respiratory distress.   Abdomen: Soft, non-distended, nontender, no mass  Skin: No obvious rash.  Back: Unable to assess  Extremities: Warm, well perfused.   Neurologic: Grossly normal   Psychiatric: Anxious & agitated      DIAGNOSTIC STUDIES / PROCEDURES    EKG  Normal rate, normal rhythm, normal axis.  No ST wave elevations or depressions.  She has a left bundle branch block.    RADIOLOGY/PROCEDURES  No orders to display        Imaging is interpreted by radiologist    Labs:  Results for orders placed or performed during the hospital encounter of 12/29/22   CBC WITH DIFFERENTIAL   Result Value Ref Range    WBC 7.5 4.8 - 10.8 K/uL    RBC 4.64 4.20 - 5.40 M/uL    Hemoglobin 14.0 12.0 - 16.0 g/dL    Hematocrit 41.0 37.0 - 47.0 %    MCV 88.4 81.4 - 97.8 fL    MCH 30.2 27.0 - 33.0 pg    MCHC 34.1 33.6 - 35.0 g/dL    RDW 44.3 35.9 - 50.0 fL    Platelet Count 332 164 - 446 K/uL    MPV 11.2 9.0 - 12.9 fL    Neutrophils-Polys 55.00 44.00 - 72.00 %    Lymphocytes 35.20 22.00 - 41.00 %    Monocytes 7.90 0.00 - 13.40 %    Eosinophils 0.50 0.00 - 6.90 %    Basophils 1.10 0.00 - 1.80 %    Immature Granulocytes 0.30 0.00 - 0.90 %    Nucleated RBC 0.00 /100 WBC    Neutrophils (Absolute) 4.13 2.00 - 7.15 K/uL    Lymphs (Absolute) 2.64 1.00 - 4.80 K/uL    Monos (Absolute) 0.59 0.00 - 0.85 K/uL    Eos (Absolute) 0.04 0.00 - 0.51 K/uL    Baso (Absolute) " 0.08 0.00 - 0.12 K/uL    Immature Granulocytes (abs) 0.02 0.00 - 0.11 K/uL    NRBC (Absolute) 0.00 K/uL   COMP METABOLIC PANEL   Result Value Ref Range    Sodium 142 135 - 145 mmol/L    Potassium 3.5 (L) 3.6 - 5.5 mmol/L    Chloride 105 96 - 112 mmol/L    Co2 19 (L) 20 - 33 mmol/L    Anion Gap 18.0 (H) 7.0 - 16.0    Glucose 155 (H) 65 - 99 mg/dL    Bun 9 8 - 22 mg/dL    Creatinine 0.66 0.50 - 1.40 mg/dL    Calcium 8.6 8.5 - 10.5 mg/dL    AST(SGOT) 37 12 - 45 U/L    ALT(SGPT) 26 2 - 50 U/L    Alkaline Phosphatase 76 30 - 99 U/L    Total Bilirubin 0.3 0.1 - 1.5 mg/dL    Albumin 4.6 3.2 - 4.9 g/dL    Total Protein 7.5 6.0 - 8.2 g/dL    Globulin 2.9 1.9 - 3.5 g/dL    A-G Ratio 1.6 g/dL   HCG QUAL SERUM   Result Value Ref Range    Beta-Hcg Qualitative Serum Negative Negative   CORRECTED CALCIUM   Result Value Ref Range    Correct Calcium 8.1 (L) 8.5 - 10.5 mg/dL   ESTIMATED GFR   Result Value Ref Range    GFR (CKD-EPI) 117 >60 mL/min/1.73 m 2   POC BREATHALIZER   Result Value Ref Range    POC Breathalizer 0.240 (A) 0.00 - 0.01 Percent   EKG   Result Value Ref Range    Report       [facility] Emergency Dept.    Test Date:  2022  Pt Name:    ROYCE GRIFFIN                 Department: ER  MRN:        [id number]                      Room:       [facility]  Gender:     Female                       Technician: [id number]  :        1990                   Requested By:ER TRIAGE PROTOCOL  Order #:    [id number]                    Inez MD:    Measurements  Intervals                                Axis  Rate:       134                          P:          129  UT:         106                          QRS:        3  QRSD:       131                          T:          44  QT:         360  QTc:        538    Interpretive Statements  Sinus tachycardia  Left bundle branch block  No previous ECG available for comparison         Medications   NS (BOLUS) infusion 1,000 mL (0 mL Intravenous Stopped 22 0447)    LORazepam (ATIVAN) injection 2 mg (2 mg Intravenous Given 12/29/22 0345)   haloperidol lactate (HALDOL) injection 5 mg (5 mg Intravenous Given 12/29/22 0345)         COURSE & MEDICAL DECISION MAKING  Pertinent Labs & Imaging studies reviewed. (See chart for details)    This is a 32-year-old who presents with alcohol intoxication and ?klonopin overdose.  She had reportedly denied any suicidal intent to EMS.  On my initial assessment patient is hyperventilating and unconsolable. She then attempted to rip out her IV and lunged forward at me attempting to leave the emergency department.  She was shouting and was not verbally redirectable.    I did not feel safe with her leaving the emergency department given that she is still intoxicated and I am acutely concerned about her mental health.  Patient was medicated with 5 mg of IV Haldol.  She then began punching, kicking and attempting to bite staff and subsequently given 2 mg of Ativan and placed in four-point restraints.    She has no signs of trauma on exam to suggest head injury.  Labs notable for hypokalemia. ASA and tylenol levels pending. Her presentation does seem to be consistent with alcohol intoxication.     4.15AM  Patient placed into ED observation.  She has been sleeping comfortably, tachycardia improving.  She is pending reassessment once metabolized.      HYDRATION: Based on the patient's presentation of Inability to take oral fluids the patient was given IV fluids. IV Hydration was used because oral hydration was not adequate alone. Upon recheck following hydration, the patient was improved.      FINAL IMPRESSION  1. Alcoholic intoxication without complication (HCC)        2. Accidental drug overdose, initial encounter        3. Agitation                 Electronically signed by:  Juliana Aleman M.D., 12/29/2022 3:39 AM